# Patient Record
Sex: MALE | Race: WHITE | Employment: OTHER | URBAN - METROPOLITAN AREA
[De-identification: names, ages, dates, MRNs, and addresses within clinical notes are randomized per-mention and may not be internally consistent; named-entity substitution may affect disease eponyms.]

---

## 2020-01-24 ENCOUNTER — OFFICE VISIT (OUTPATIENT)
Dept: SURGERY | Age: 80
End: 2020-01-24

## 2020-01-24 VITALS
DIASTOLIC BLOOD PRESSURE: 87 MMHG | TEMPERATURE: 97.7 F | HEIGHT: 66 IN | OXYGEN SATURATION: 95 % | RESPIRATION RATE: 20 BRPM | SYSTOLIC BLOOD PRESSURE: 138 MMHG | WEIGHT: 183.4 LBS | HEART RATE: 76 BPM | BODY MASS INDEX: 29.47 KG/M2

## 2020-01-24 DIAGNOSIS — K40.91 RECURRENT RIGHT INGUINAL HERNIA: Primary | ICD-10-CM

## 2020-01-24 RX ORDER — HYDROCORTISONE 25 MG/G
CREAM TOPICAL AS NEEDED
COMMUNITY

## 2020-01-24 RX ORDER — MELOXICAM 15 MG/1
15 TABLET ORAL DAILY
COMMUNITY
End: 2022-01-04

## 2020-01-24 RX ORDER — PREDNISOLONE ACETATE 10 MG/ML
1 SUSPENSION/ DROPS OPHTHALMIC 4 TIMES DAILY
COMMUNITY
End: 2022-01-04

## 2020-01-24 NOTE — PROGRESS NOTES
To: Army Leonardo MD    From: Dora Farooq MD    Thank you for sending Amanda Molina to see us. Enjoyed meeting him and his wife. Encounter Date: 1/24/2020  History and Physical    Assessment:   Recurrent right inguinal hernia. Asymptomatic. Adipose only on US in the office today. Body mass index is 29.6 kg/m². Comorbid AVS, CAD, severe arthritis -- neck involvement. S/p open bilateral inguinal hernia repairs with mesh 2009. Fair functional status limited by arthritis, but does exercise. Daily baby aspirin. Plan: At this point I recommend watchful waiting. His hernia is asymptomatic and low risk for bowel strangulation. His comorbidities would put him at increased perioperative risk likely greater than the risks of incarceration or strangulation. We discussed the fact, that if the hernia becomes painful, ifhe begins to notice an enlarging bulge, or if it limits his exercise, we will need to reassess. Discussed possibility of incarceration, strangulation, increase in size of the hernia over time, and the risk of emergency surgery in the face of strangulation. Discussed techniques for reducing the hernia should it not reduce spontaneously. Knows to call immediately for incarceration. He has our card and was told to call anytime. HPI:   Marina Kwan is a [de-identified] y.o. male who is seen in consultation at the request of Army Leonardo MD for recurrent right inguinal hernia. It was found on routine physical.  Patient denies knowing it was there. Denies pain. Has not noticed a bulge. No n/v. No change in BM's. No urinary sxs. His activity is somewhat limited by his arthritis but he does regular exercise and is no limited by any groin discomfort.       Past Medical History:   Diagnosis Date    Arthritis     CAD (coronary artery disease)     Dementia ([facility] [address]     cognitive impairment    Gastrointestinal disorder     GERD    Hypertension     Psychiatric disorder     mild cognitive impairment     Past Surgical History:   Procedure Laterality Date    HX HERNIA REPAIR      HX ORTHOPAEDIC      right knee surgery    HX ORTHOPAEDIC      right hip replacement    HX ORTHOPAEDIC      carpal tunel repair      History reviewed. No pertinent family history. Social History     Tobacco Use    Smoking status: Former Smoker     Last attempt to quit: 1984     Years since quittin.7    Smokeless tobacco: Never Used   Substance Use Topics    Alcohol use: Yes     Alcohol/week: 4.0 standard drinks     Types: 2 Glasses of wine, 2 Cans of beer per week      Current Outpatient Medications   Medication Sig    meloxicam (MOBIC) 15 mg tablet Take 15 mg by mouth daily.  hydrocortisone (HYTONE) 2.5 % topical cream Apply  to affected area two (2) times a day. use thin layer    prednisoLONE acetate (PRED FORTE) 1 % ophthalmic suspension Administer 1 Drop to both eyes four (4) times daily.  peg 400-propylene glycol (SYSTANE, PROPYLENE GLYCOL,) 0.4-0.3 % drop as needed.  calcium carbonate (TUMS PO) Take  by mouth daily.  memantine (NAMENDA) 10 mg tablet Take 28 mg by mouth daily.  rivastigmine (EXELON) 9.5 mg/24 hr patch 1 Patch by TransDERmal route daily. Indications: 13.3 patch currently    lisinopril (PRINIVIL, ZESTRIL) 20 mg tablet Take 20 mg by mouth daily.  ezetimibe-simvastatin (VYTORIN 10/40) 10-40 mg per tablet Take 1 Tab by mouth daily. Indications: hypercholesterolemia, hyperlipidemia    buPROPion SR (WELLBUTRIN SR) 150 mg SR tablet Take 150 mg by mouth daily. Indications: major depressive disorder    raNITIdine (ZANTAC) 300 mg tablet Take 300 mg by mouth daily. Indications: gastroesophageal reflux disease    aspirin delayed-release 81 mg tablet Take  by mouth daily. Indications: myocardial infarction prevention, prevention of transient ischemic attack    tamsulosin (FLOMAX) 0.4 mg capsule Take 0.4 mg by mouth daily.  Indications: SYMPTOMATIC BENIGN PROSTATIC HYPERPLASIA    fish oil-dha-epa 1,200-144-216 mg cap Take 1,200 mg by mouth daily. Indications: hytperlipidemia    cholecalciferol (VITAMIN D3) 1,000 unit cap Take 1,000 Units by mouth daily. Indications: PREVENTION OF VITAMIN D DEFICIENCY    co-enzyme Q-10 (COQ-10) 100 mg capsule Take 100 mg by mouth daily. Indications: cardiac health    ascorbic acid, vitamin C, (VITAMIN C) 500 mg tablet Take 500 mg by mouth daily. Indications: VITAMIN C DEFICIENCY    b complex vitamins tablet Take 1 Tab by mouth daily. Indications: VITAMIN DEFICIENCY    melatonin tab tablet Take 5 mg by mouth nightly. Indications: sleep aid    acetaminophen (TYLENOL) 650 mg CR tablet Take 650 mg by mouth nightly. Indications: Pain     No current facility-administered medications for this visit. Allergies:  No Known Allergies     Review of Systems:  10 systems reviewed. See scanned sheet in \"Media\" section. See HPI for pertinent positives and negatives. Objective:     Visit Vitals  /87 (BP 1 Location: Left arm, BP Patient Position: Sitting)   Pulse 76   Temp 97.7 °F (36.5 °C) (Oral)   Resp 20   Ht 5' 6\" (1.676 m)   Wt 83.2 kg (183 lb 6.4 oz)   SpO2 95%   BMI 29.60 kg/m²       Physical Exam:  General appearance  Alert, cooperative, no distress, appears stated age, slow gait. HEENT Anicteric   Neck Decreased ROM. Back   No CVA tenderness   Lungs   Clear to auscultation bilaterally   Heart  Regular rate and rhythm. Abdomen   Soft. Bowel sounds normal. RIH present with valsalva. NT. Spontaneously reduces after valsalva.      Extremities no cyanosis or edema   Pulses 2+ right radial   Skin Skin color, texture, turgor normal.   Lymph nodes Inguinal nodes normal.   Neurologic Without overt sensory or motor deficit     Signed By: Elizabeth Ray MD     January 24, 2020

## 2020-01-24 NOTE — PROGRESS NOTES
Chief Complaint   Patient presents with    Possible Hernia     Pt seen @ the request of Kristi Leal to evaluate Possible re-current inguinal hernia. 1. Have you been to the ER, urgent care clinic since your last visit? Hospitalized since your last visit? New consult    2. Have you seen or consulted any other health care providers outside of the [address] [place] [address] since your last visit? new consult  Include any pap smears or colon screening. Discussed advanced directive. Patient states that he does  have an advanced directive.

## 2020-01-24 NOTE — Clinical Note
1/24/20 Patient: Raine Harris YOB: 1940 Date of Visit: 1/24/2020 Kylah Adkins MD 
[address] [facility] [address] [id number] VIA Facsimile: [phone] Dear Kylah Adkins MD, Thank you for referring Mr. Odalys Brown to [address] for evaluation. My notes for this consultation are attached. If you have questions, please do not hesitate to call me. I look forward to following your patient along with you.  
 
 
Sincerely, 
 
Alex Hardin MD

## 2021-08-09 ENCOUNTER — TRANSCRIBE ORDER (OUTPATIENT)
Dept: REGISTRATION | Age: 81
End: 2021-08-09

## 2021-08-09 ENCOUNTER — HOSPITAL ENCOUNTER (OUTPATIENT)
Dept: GENERAL RADIOLOGY | Age: 81
Discharge: HOME | End: 2021-08-09
Payer: MEDICARE

## 2021-08-09 DIAGNOSIS — M25.562 LEFT KNEE PAIN: Primary | ICD-10-CM

## 2021-08-09 DIAGNOSIS — M25.562 LEFT KNEE PAIN: ICD-10-CM

## 2021-08-09 PROCEDURE — 73562 X-RAY EXAM OF KNEE 3: CPT

## 2021-12-30 ENCOUNTER — HOSPITAL ENCOUNTER (OUTPATIENT)
Dept: PREADMISSION TESTING | Age: 81
Discharge: HOME | End: 2021-12-30
Payer: MEDICARE

## 2021-12-30 PROCEDURE — U0005 INFEC AGEN DETEC AMPLI PROBE: HCPCS

## 2021-12-31 LAB
SARS-COV-2, XPLCVT: NOT DETECTED
SOURCE, COVRS: NORMAL

## 2022-01-04 RX ORDER — FINASTERIDE 5 MG/1
5 TABLET, FILM COATED ORAL DAILY
COMMUNITY

## 2022-01-04 RX ORDER — LANOLIN ALCOHOL/MO/W.PET/CERES
1000 CREAM (GRAM) TOPICAL DAILY
COMMUNITY

## 2022-01-05 ENCOUNTER — ANESTHESIA EVENT (OUTPATIENT)
Dept: ENDOSCOPY | Age: 82
End: 2022-01-05
Payer: MEDICARE

## 2022-01-05 ENCOUNTER — HOSPITAL ENCOUNTER (OUTPATIENT)
Age: 82
Setting detail: OUTPATIENT SURGERY
Discharge: HOME | End: 2022-01-05
Attending: INTERNAL MEDICINE | Admitting: INTERNAL MEDICINE
Payer: MEDICARE

## 2022-01-05 ENCOUNTER — ANESTHESIA (OUTPATIENT)
Dept: ENDOSCOPY | Age: 82
End: 2022-01-05
Payer: MEDICARE

## 2022-01-05 VITALS
SYSTOLIC BLOOD PRESSURE: 143 MMHG | OXYGEN SATURATION: 99 % | HEIGHT: 67 IN | BODY MASS INDEX: 27.47 KG/M2 | WEIGHT: 175 LBS | TEMPERATURE: 97.7 F | RESPIRATION RATE: 17 BRPM | DIASTOLIC BLOOD PRESSURE: 57 MMHG | HEART RATE: 77 BPM

## 2022-01-05 PROCEDURE — 76060000031 HC ANESTHESIA FIRST 0.5 HR: Performed by: INTERNAL MEDICINE

## 2022-01-05 PROCEDURE — 77030013992 HC SNR POLYP ENDOSC BSC -B: Performed by: INTERNAL MEDICINE

## 2022-01-05 PROCEDURE — 74011000250 HC RX REV CODE- 250: Performed by: NURSE ANESTHETIST, CERTIFIED REGISTERED

## 2022-01-05 PROCEDURE — 88305 TISSUE EXAM BY PATHOLOGIST: CPT

## 2022-01-05 PROCEDURE — 76040000019: Performed by: INTERNAL MEDICINE

## 2022-01-05 PROCEDURE — 74011250636 HC RX REV CODE- 250/636: Performed by: NURSE ANESTHETIST, CERTIFIED REGISTERED

## 2022-01-05 PROCEDURE — 2709999900 HC NON-CHARGEABLE SUPPLY: Performed by: INTERNAL MEDICINE

## 2022-01-05 PROCEDURE — 74011250636 HC RX REV CODE- 250/636: Performed by: INTERNAL MEDICINE

## 2022-01-05 RX ORDER — FLUMAZENIL 0.1 MG/ML
0.2 INJECTION INTRAVENOUS
Status: DISCONTINUED | OUTPATIENT
Start: 2022-01-05 | End: 2022-01-05 | Stop reason: HOSPADM

## 2022-01-05 RX ORDER — SODIUM CHLORIDE 0.9 % (FLUSH) 0.9 %
5-40 SYRINGE (ML) INJECTION AS NEEDED
Status: DISCONTINUED | OUTPATIENT
Start: 2022-01-05 | End: 2022-01-05 | Stop reason: HOSPADM

## 2022-01-05 RX ORDER — GLYCOPYRROLATE 0.2 MG/ML
INJECTION INTRAMUSCULAR; INTRAVENOUS AS NEEDED
Status: DISCONTINUED | OUTPATIENT
Start: 2022-01-05 | End: 2022-01-05 | Stop reason: HOSPADM

## 2022-01-05 RX ORDER — PROPOFOL 10 MG/ML
INJECTION, EMULSION INTRAVENOUS AS NEEDED
Status: DISCONTINUED | OUTPATIENT
Start: 2022-01-05 | End: 2022-01-05 | Stop reason: HOSPADM

## 2022-01-05 RX ORDER — NALOXONE HYDROCHLORIDE 0.4 MG/ML
0.4 INJECTION, SOLUTION INTRAMUSCULAR; INTRAVENOUS; SUBCUTANEOUS
Status: DISCONTINUED | OUTPATIENT
Start: 2022-01-05 | End: 2022-01-05 | Stop reason: HOSPADM

## 2022-01-05 RX ORDER — EPINEPHRINE 0.1 MG/ML
1 INJECTION INTRACARDIAC; INTRAVENOUS
Status: DISCONTINUED | OUTPATIENT
Start: 2022-01-05 | End: 2022-01-05 | Stop reason: HOSPADM

## 2022-01-05 RX ORDER — DEXTROMETHORPHAN/PSEUDOEPHED 2.5-7.5/.8
1.2 DROPS ORAL
Status: DISCONTINUED | OUTPATIENT
Start: 2022-01-05 | End: 2022-01-05 | Stop reason: HOSPADM

## 2022-01-05 RX ORDER — ATROPINE SULFATE 0.1 MG/ML
0.5 INJECTION INTRAVENOUS
Status: DISCONTINUED | OUTPATIENT
Start: 2022-01-05 | End: 2022-01-05 | Stop reason: HOSPADM

## 2022-01-05 RX ORDER — ONDANSETRON 2 MG/ML
INJECTION INTRAMUSCULAR; INTRAVENOUS AS NEEDED
Status: DISCONTINUED | OUTPATIENT
Start: 2022-01-05 | End: 2022-01-05 | Stop reason: HOSPADM

## 2022-01-05 RX ORDER — LIDOCAINE HYDROCHLORIDE 20 MG/ML
INJECTION, SOLUTION EPIDURAL; INFILTRATION; INTRACAUDAL; PERINEURAL AS NEEDED
Status: DISCONTINUED | OUTPATIENT
Start: 2022-01-05 | End: 2022-01-05 | Stop reason: HOSPADM

## 2022-01-05 RX ORDER — SODIUM CHLORIDE 9 MG/ML
100 INJECTION, SOLUTION INTRAVENOUS CONTINUOUS
Status: DISCONTINUED | OUTPATIENT
Start: 2022-01-05 | End: 2022-01-05 | Stop reason: HOSPADM

## 2022-01-05 RX ORDER — SODIUM CHLORIDE 0.9 % (FLUSH) 0.9 %
5-40 SYRINGE (ML) INJECTION EVERY 8 HOURS
Status: DISCONTINUED | OUTPATIENT
Start: 2022-01-05 | End: 2022-01-05 | Stop reason: HOSPADM

## 2022-01-05 RX ADMIN — PROPOFOL 20 MG: 10 INJECTION, EMULSION INTRAVENOUS at 11:23

## 2022-01-05 RX ADMIN — PROPOFOL 100 MG: 10 INJECTION, EMULSION INTRAVENOUS at 11:18

## 2022-01-05 RX ADMIN — GLYCOPYRROLATE 0.2 MG: 0.2 INJECTION INTRAMUSCULAR; INTRAVENOUS at 11:25

## 2022-01-05 RX ADMIN — PROPOFOL 100 MG: 10 INJECTION, EMULSION INTRAVENOUS at 11:13

## 2022-01-05 RX ADMIN — GLYCOPYRROLATE 0.2 MG: 0.2 INJECTION INTRAMUSCULAR; INTRAVENOUS at 11:21

## 2022-01-05 RX ADMIN — SODIUM CHLORIDE 100 MCG: 900 INJECTION, SOLUTION INTRAVENOUS at 11:26

## 2022-01-05 RX ADMIN — SODIUM CHLORIDE 100 ML/HR: 0.9 INJECTION, SOLUTION INTRAVENOUS at 11:05

## 2022-01-05 RX ADMIN — LIDOCAINE HYDROCHLORIDE 40 MG: 20 INJECTION, SOLUTION EPIDURAL; INFILTRATION; INTRACAUDAL; PERINEURAL at 11:10

## 2022-01-05 RX ADMIN — ONDANSETRON HYDROCHLORIDE 4 MG: 2 INJECTION, SOLUTION INTRAMUSCULAR; INTRAVENOUS at 11:14

## 2022-01-05 NOTE — DISCHARGE INSTRUCTIONS
Lisandra Benson MD  [facility], [address] [facility]  [place], [address] [facility]  [phone]  [email or web address]. [email or web address]. [facility]  [id number]  1940    COLON DISCHARGE INSTRUCTIONS  Discomfort:  Redness at IV site- apply warm compress to area; if redness or soreness persist- contact your physician  There may be a slight amount of blood passed from the rectum  Gaseous discomfort- walking, belching will help relieve any discomfort  You may not operate a vehicle for 12 hours  You may not engage in an occupation involving machinery or appliances for rest of today  You may not drink alcoholic beverages for at least 12 hours  Avoid making any critical decisions for at least 24 hour  DIET:   High fiber diet. - however -  remember your colon is empty and a heavy meal will produce gas. Avoid these foods:  vegetables, fried / greasy foods, carbonated drinks for today      ACTIVITY:  You may resume your normal daily activities it is recommended that you spend the remainder of the day resting -  avoid any strenuous activity. CALL M.D. ANY SIGN OF:   Increasing pain, nausea, vomiting  Abdominal distension (swelling)  New increased bleeding (oral or rectal)  Fever (chills)  Pain in chest area  Bloody discharge from nose or mouth  Shortness of breath     COLONOSCOPY FINDINGS:  Your colonoscopy showed: one small polyp which we removed. Also have some internal hemorrhoids and diverticulosis. Follow-up Instructions:   Call Dr. Lisandra Benson if any questions or problems. Telephone # [phone]  Dr. Ld Styles office will notify you of the biopsy results within 7 to 10 days. Should have a repeat colonoscopy in 5 years.

## 2022-01-05 NOTE — ROUTINE PROCESS
Jacque Dears  1940  [id number]    Situation:  Verbal report received from: Erika  Procedure: Procedure(s):  COLONOSCOPY  ENDOSCOPIC POLYPECTOMY    Background:    Preoperative diagnosis: Encounter for screening colonoscopy [Z12.11]  Postoperative diagnosis: Diverticulosis, polyp, hemorrhoids    :  Dr. Fredy Schafer  Assistant(s): Endoscopy Technician-1: Jace Abreu  Endoscopy RN-1: Brookie Oppenheim, RN    Specimens:   ID Type Source Tests Collected by Time Destination   1 : Cecum polyp Preservative Cecum  Maryjane Wall MD 1/5/2022 1121 Pathology     H. Pylori  no    Assessment:  Intra-procedure medications     Anesthesia gave intra-procedure sedation and medications, see anesthesia flow sheet yes    Intravenous fluids: NS@ KVO     Vital signs stable yes    Abdominal assessment: round and soft yes    Recommendation:

## 2022-01-05 NOTE — H&P
Isreal Warren MD  [facility], [address]  [phone]  [email or web address]    Gastroenterology Outpatient History and Physical    Patient: Reza Collins    Physician: Vivian Soto MD    Vital Signs: Blood pressure (!) 169/70, pulse 70, temperature 98 °F (36.7 °C), resp. rate 22, height 5' 7\" (1.702 m), weight 79.4 kg (175 lb), SpO2 99 %. Allergies: No Known Allergies    Chief Complaint: Hx of polyps    History of Present Illness: Personal history of colonic polyps. Last colonoscopy was about 5 years ago at [facility] and showed polyp. Currently has no GI symptoms. No FH of colon cancer or polyps. History:  Past Medical History:   Diagnosis Date    Arthritis     CAD (coronary artery disease)     Chronic pain     generalized pain    Dementia ([address]     cognitive impairment    Gastrointestinal disorder     GERD    Hypertension     Psychiatric disorder     mild cognitive impairment    Sleep apnea     c-pap      Past Surgical History:   Procedure Laterality Date    HX CATARACT REMOVAL Bilateral     HX HERNIA REPAIR Bilateral     HX ORTHOPAEDIC Right     quad repair    HX ORTHOPAEDIC      right hip replacement    HX ORTHOPAEDIC      carpal tunel repair    HX ROTATOR CUFF REPAIR Right       Social History     Socioeconomic History    Marital status:    Tobacco Use    Smoking status: Former Smoker     Quit date: 1984     Years since quittin.7    Smokeless tobacco: Never Used   Substance and Sexual Activity    Alcohol use: Yes     Alcohol/week: 4.0 standard drinks     Types: 2 Glasses of wine, 2 Cans of beer per week    Drug use: No    History reviewed. No pertinent family history. There is no problem list on file for this patient. Medications:   Prior to Admission medications    Medication Sig Start Date End Date Taking?  Authorizing Provider   finasteride (PROSCAR) 5 mg tablet Take 5 mg by mouth daily. Yes Provider, Historical   cyanocobalamin (Vitamin B-12) 1,000 mcg tablet Take 1,000 mcg by mouth daily. Yes Provider, Historical   hydrocortisone (HYTONE) 2.5 % topical cream Apply  to affected area as needed. use thin layer    Yes Provider, Historical   rivastigmine (EXELON) 9.5 mg/24 hr patch 1 Patch by TransDERmal route daily. Indications: 13.3 patch currently   Yes Other, MD [place]   lisinopril (PRINIVIL, ZESTRIL) 20 mg tablet Take 20 mg by mouth daily. Yes Other, MD [place]   ezetimibe-simvastatin (VYTORIN 10/40) 10-40 mg per tablet Take 1 Tab by mouth daily. Indications: hypercholesterolemia, hyperlipidemia   Yes Other, MD [place]   buPROPion SR (WELLBUTRIN SR) 150 mg SR tablet Take 150 mg by mouth daily. Indications: major depressive disorder   Yes Other, MD [place]   raNITIdine (ZANTAC) 300 mg tablet Take 300 mg by mouth daily. Indications: gastroesophageal reflux disease   Yes Other, MD [place]   aspirin delayed-release 81 mg tablet Take  by mouth daily. Indications: myocardial infarction prevention, prevention of transient ischemic attack   Yes Other, MD [place]   tamsulosin (FLOMAX) 0.4 mg capsule Take 0.4 mg by mouth daily. Indications: SYMPTOMATIC BENIGN PROSTATIC HYPERPLASIA   Yes Other, MD [place]   fish oil-dha-epa 1,200-144-216 mg cap Take 1,200 mg by mouth daily. Indications: hytperlipidemia   Yes Other, MD [place]   cholecalciferol (VITAMIN D3) 1,000 unit cap Take 1,000 Units by mouth daily. Indications: PREVENTION OF VITAMIN D DEFICIENCY   Yes Other, MD [place]   co-enzyme Q-10 (COQ-10) 100 mg capsule Take 100 mg by mouth daily. Indications: cardiac health   Yes Other, MD [place]   b complex vitamins tablet Take 1 Tab by mouth daily. Indications: VITAMIN DEFICIENCY   Yes Other, MD [place]   peg 400-propylene glycol (SYSTANE, PROPYLENE GLYCOL,) 0.4-0.3 % drop as needed. Provider, Historical   calcium carbonate (TUMS PO) Take  by mouth as needed.     Provider, Historical   acetaminophen (TYLENOL) 650 mg CR tablet Take 650 mg by mouth as needed.  Indications: pain    Other, Phys, MD       Physical Exam:     General: well developed, well nourished   HEENT: unremarkable   Heart: regular rhythm no mumur    Lungs: clear   Abdominal:  benign   Neurological: unremarkable   Extremities: no edema     Findings/Diagnosis: Personal history of colonic polyps    Plan of Care/Planned Procedure: Colonoscopy with monitored anesthesia care sedation    Signed:  Era Severance., MD 1/5/2022

## 2022-01-05 NOTE — ANESTHESIA POSTPROCEDURE EVALUATION
Procedure(s):  COLONOSCOPY  ENDOSCOPIC POLYPECTOMY. total IV anesthesia    Anesthesia Post Evaluation        Patient location during evaluation: PACU  Note status: Adequate. Level of consciousness: responsive to verbal stimuli and sleepy but conscious  Pain management: satisfactory to patient  Airway patency: patent  Anesthetic complications: no  Cardiovascular status: acceptable  Respiratory status: acceptable  Hydration status: acceptable  Comments: +Post-Anesthesia Evaluation and Assessment    Patient: Mansoor Foster MRN: [id number]  SSN: [id number]   YOB: 1940  Age: 79 y.o. Sex: male      Cardiovascular Function/Vital Signs    BP (!) 111/57   Pulse 79   Temp 36.5 °C (97.7 °F)   Resp 14   Ht 5' 7\" (1.702 m)   Wt 79.4 kg (175 lb)   SpO2 99%   BMI 27.41 kg/m²     Patient is status post Procedure(s):  COLONOSCOPY  ENDOSCOPIC POLYPECTOMY. Nausea/Vomiting: Controlled. Postoperative hydration reviewed and adequate. Pain:  Pain Scale 1: Numeric (0 - 10) (01/05/22 1141)  Pain Intensity 1: 0 (01/05/22 1141)   Managed. Neurological Status: At baseline. Mental Status and Level of Consciousness: Arousable. Pulmonary Status:   O2 Device: None (Room air) (01/05/22 1141)   Adequate oxygenation and airway patent. Complications related to anesthesia: None    Post-anesthesia assessment completed. No concerns. Signed By: Hima Schuster MD    1/5/2022  Post anesthesia nausea and vomiting:  controlled      INITIAL Post-op Vital signs:   Vitals Value Taken Time   /71 01/05/22 1148   Temp 36.5 °C (97.7 °F) 01/05/22 1139   Pulse 78 01/05/22 1148   Resp 19 01/05/22 1148   SpO2 96 % 01/05/22 1148   Vitals shown include unvalidated device data.

## 2022-01-05 NOTE — PROGRESS NOTES
Endoscope was pre-cleaned at the bedside immediately following procedure by [facility] ET. For medications administered by anesthesia, see anesthesia chart.

## 2022-01-05 NOTE — ANESTHESIA PREPROCEDURE EVALUATION
Relevant Problems   No relevant active problems       Anesthetic History   No history of anesthetic complications            Review of Systems / Medical History  Patient summary reviewed, nursing notes reviewed and pertinent labs reviewed    Pulmonary        Sleep apnea: CPAP  Smoker      Comments: Former Smoker   Neuro/Psych         Psychiatric history    Comments: mild cognitive impairment Cardiovascular    Hypertension          CAD    Exercise tolerance: >4 METS  Comments: EKG 5/1/17: Sinus rhythm with marked sinus arrhythmia with occasional premature   ventricular complexes   Otherwise normal ECG    GI/Hepatic/Renal     GERD           Endo/Other        Arthritis     Other Findings   Comments: DJD  Chronic pain  Hearing loss          Physical Exam    Airway  Mallampati: II    Neck ROM: normal range of motion   Mouth opening: Normal     Cardiovascular  Regular rate and rhythm,  S1 and S2 normal,  no murmur, click, rub, or gallop             Dental    Dentition: Caps/crowns     Pulmonary  Breath sounds clear to auscultation               Abdominal  GI exam deferred       Other Findings            Anesthetic Plan    ASA: 3  Anesthesia type: total IV anesthesia          Induction: Intravenous  Anesthetic plan and risks discussed with: Patient

## 2022-01-05 NOTE — PROCEDURES
[facility] Matamor[facility]                  Colonoscopy Operative Report    1/5/2022      Bradley Factor  [id number]  1940    Procedure Type:   Colonoscopy --screening     Indications:    Personal history of colon polyps (screening only)     Pre-operative Diagnosis: see indication above    Post-operative Diagnosis:  See findings below    :  Everardo Reina MD    Referring Provider: Jennifer Haney MD      Sedation:  MAC anesthesia Propofol    Pre-Procedural Exam:      Airway: clear,  No airway problems anticipated  Heart: RRR, without gallops or rubs  Lungs: clear bilaterally without wheezes, crackles, or rhonchi  Abdomen: soft, nontender, nondistended, bowel sounds present  Mental Status: awake, alert and oriented to person, place and time     Procedure Details:  After informed consent was obtained with all risks and benefits of procedure explained and preoperative exam completed, the patient was taken to the endoscopy suite and placed in the left lateral decubitus position. Upon sequential sedation as per above, a digital rectal exam was performed . The Olympus videocolonoscope  was inserted in the rectum and carefully advanced to the cecum, which was identified by the ileocecal valve and appendiceal orifice. The cecum was identified by the ileocecal valve and appendiceal orifice. The quality of preparation was good. The colonoscope was slowly withdrawn with careful evaluation between folds. Retroflexion in the rectum was completed demonstrating internal hemorrhoids. Findings:   Rectum: Grade 1 internal hemorrhoid(s); Sigmoid:     - Diverticulosis  Descending Colon: Diverticulosis  Transverse Colon: normal  Ascending Colon: normal  Cecum: 6 mm polyp, cold snared  Terminal Ileum: not intubated      Specimen Removed:  cecal polyp    Complications: None. EBL:  None.     Impression:    diverticulosis,  Moderate in degree, involving the descending colon and the sigmoid  hemorrhoids internal, Moderate in size  small cecal polyp, removed    Recommendations: --Await pathology. , -Repeat colonoscopy in 5 years. High fiber diet. Resume normal medication(s). Discharge Disposition:  Home in the company of a  when able to ambulate. Pradeep Turner MD    1/5/2022     JUAN CARLOS Leger MD  [facility], [address] [facility] [address]  [phone]  [email or web address]

## 2022-07-07 ENCOUNTER — TRANSCRIBE ORDER (OUTPATIENT)
Dept: SCHEDULING | Age: 82
End: 2022-07-07

## 2022-07-07 DIAGNOSIS — M81.0 OSTEOPOROSIS: Primary | ICD-10-CM

## 2022-07-12 ENCOUNTER — TRANSCRIBE ORDER (OUTPATIENT)
Dept: REGISTRATION | Age: 82
End: 2022-07-12

## 2022-07-12 ENCOUNTER — HOSPITAL ENCOUNTER (OUTPATIENT)
Dept: GENERAL RADIOLOGY | Age: 82
Discharge: HOME | End: 2022-07-12
Payer: MEDICARE

## 2022-07-12 DIAGNOSIS — M54.50 LOW BACK PAIN: ICD-10-CM

## 2022-07-12 DIAGNOSIS — M54.50 LOW BACK PAIN: Primary | ICD-10-CM

## 2022-07-12 PROCEDURE — 72110 X-RAY EXAM L-2 SPINE 4/>VWS: CPT

## 2022-07-21 ENCOUNTER — TRANSCRIBE ORDER (OUTPATIENT)
Dept: SCHEDULING | Age: 82
End: 2022-07-21

## 2022-07-21 DIAGNOSIS — S32.010A WEDGE COMPRESSION FRACTURE OF FIRST LUMBAR VERTEBRA, INITIAL ENCOUNTER FOR CLOSED FRACTURE (HCC): Primary | ICD-10-CM

## 2022-07-25 ENCOUNTER — HOSPITAL ENCOUNTER (OUTPATIENT)
Dept: MRI IMAGING | Age: 82
Discharge: HOME | End: 2022-07-25
Attending: INTERNAL MEDICINE

## 2022-07-25 DIAGNOSIS — S32.010A WEDGE COMPRESSION FRACTURE OF FIRST LUMBAR VERTEBRA, INITIAL ENCOUNTER FOR CLOSED FRACTURE (HCC): ICD-10-CM

## 2022-07-29 ENCOUNTER — HOSPITAL ENCOUNTER (OUTPATIENT)
Dept: MRI IMAGING | Age: 82
Discharge: HOME | End: 2022-07-29
Attending: INTERNAL MEDICINE
Payer: MEDICARE

## 2022-07-29 PROCEDURE — 72148 MRI LUMBAR SPINE W/O DYE: CPT

## 2022-08-25 ENCOUNTER — HOSPITAL ENCOUNTER (OUTPATIENT)
Dept: GENERAL RADIOLOGY | Age: 82
Discharge: HOME | End: 2022-08-25
Attending: INTERNAL MEDICINE
Payer: MEDICARE

## 2022-08-25 DIAGNOSIS — M81.0 OSTEOPOROSIS: ICD-10-CM

## 2022-08-25 PROCEDURE — 77080 DXA BONE DENSITY AXIAL: CPT

## 2022-11-02 ENCOUNTER — HOSPITAL ENCOUNTER (EMERGENCY)
Age: 82
Discharge: HOME | End: 2022-11-02
Attending: EMERGENCY MEDICINE
Payer: MEDICARE

## 2022-11-02 ENCOUNTER — APPOINTMENT (OUTPATIENT)
Dept: CT IMAGING | Age: 82
End: 2022-11-02
Attending: EMERGENCY MEDICINE
Payer: MEDICARE

## 2022-11-02 VITALS
RESPIRATION RATE: 15 BRPM | TEMPERATURE: 98.7 F | SYSTOLIC BLOOD PRESSURE: 149 MMHG | DIASTOLIC BLOOD PRESSURE: 69 MMHG | OXYGEN SATURATION: 94 % | HEIGHT: 66 IN | HEART RATE: 89 BPM | BODY MASS INDEX: 28.12 KG/M2 | WEIGHT: 175 LBS

## 2022-11-02 DIAGNOSIS — R10.13 DYSPEPSIA: Primary | ICD-10-CM

## 2022-11-02 LAB
ALBUMIN SERPL-MCNC: 3.4 G/DL (ref 3.5–5)
ALBUMIN/GLOB SERPL: 1.1 {RATIO} (ref 1.1–2.2)
ALP SERPL-CCNC: 60 U/L (ref 45–117)
ALT SERPL-CCNC: 24 U/L (ref 12–78)
ANION GAP SERPL CALC-SCNC: 11 MMOL/L (ref 5–15)
AST SERPL-CCNC: 15 U/L (ref 15–37)
BASOPHILS # BLD: 0 K/UL (ref 0–0.1)
BASOPHILS NFR BLD: 0 % (ref 0–1)
BILIRUB SERPL-MCNC: 0.7 MG/DL (ref 0.2–1)
BUN SERPL-MCNC: 22 MG/DL (ref 6–20)
BUN/CREAT SERPL: 24 (ref 12–20)
CALCIUM SERPL-MCNC: 8.8 MG/DL (ref 8.5–10.1)
CHLORIDE SERPL-SCNC: 105 MMOL/L (ref 97–108)
CO2 SERPL-SCNC: 25 MMOL/L (ref 21–32)
CREAT SERPL-MCNC: 0.92 MG/DL (ref 0.7–1.3)
DIFFERENTIAL METHOD BLD: ABNORMAL
EOSINOPHIL # BLD: 0.1 K/UL (ref 0–0.4)
EOSINOPHIL NFR BLD: 1 % (ref 0–7)
ERYTHROCYTE [DISTWIDTH] IN BLOOD BY AUTOMATED COUNT: 12.2 % (ref 11.5–14.5)
FLUAV RNA SPEC QL NAA+PROBE: NOT DETECTED
FLUBV RNA SPEC QL NAA+PROBE: NOT DETECTED
GLOBULIN SER CALC-MCNC: 3.2 G/DL (ref 2–4)
GLUCOSE SERPL-MCNC: 167 MG/DL (ref 65–100)
HCT VFR BLD AUTO: 47.1 % (ref 36.6–50.3)
HGB BLD-MCNC: 16.5 G/DL (ref 12.1–17)
IMM GRANULOCYTES # BLD AUTO: 0 K/UL (ref 0–0.04)
IMM GRANULOCYTES NFR BLD AUTO: 0 % (ref 0–0.5)
INR PPP: 1 (ref 0.9–1.1)
LIPASE SERPL-CCNC: 87 U/L (ref 73–393)
LYMPHOCYTES # BLD: 0.4 K/UL (ref 0.8–3.5)
LYMPHOCYTES NFR BLD: 5 % (ref 12–49)
MAGNESIUM SERPL-MCNC: 1.7 MG/DL (ref 1.6–2.4)
MCH RBC QN AUTO: 33.9 PG (ref 26–34)
MCHC RBC AUTO-ENTMCNC: 35 G/DL (ref 30–36.5)
MCV RBC AUTO: 96.7 FL (ref 80–99)
MONOCYTES # BLD: 0.4 K/UL (ref 0–1)
MONOCYTES NFR BLD: 6 % (ref 5–13)
NEUTS SEG # BLD: 6.3 K/UL (ref 1.8–8)
NEUTS SEG NFR BLD: 88 % (ref 32–75)
NRBC # BLD: 0 K/UL (ref 0–0.01)
NRBC BLD-RTO: 0 PER 100 WBC
PLATELET # BLD AUTO: 173 K/UL (ref 150–400)
PLATELET COMMENTS,PCOM: ABNORMAL
PMV BLD AUTO: 9 FL (ref 8.9–12.9)
POTASSIUM SERPL-SCNC: 3.4 MMOL/L (ref 3.5–5.1)
PROT SERPL-MCNC: 6.6 G/DL (ref 6.4–8.2)
PROTHROMBIN TIME: 10.4 SEC (ref 9–11.1)
RBC # BLD AUTO: 4.87 M/UL (ref 4.1–5.7)
RBC MORPH BLD: ABNORMAL
SARS-COV-2, COV2: NOT DETECTED
SODIUM SERPL-SCNC: 141 MMOL/L (ref 136–145)
TROPONIN-HIGH SENSITIVITY: 14 NG/L (ref 0–76)
WBC # BLD AUTO: 7.2 K/UL (ref 4.1–11.1)

## 2022-11-02 PROCEDURE — 74011000250 HC RX REV CODE- 250: Performed by: EMERGENCY MEDICINE

## 2022-11-02 PROCEDURE — 36415 COLL VENOUS BLD VENIPUNCTURE: CPT

## 2022-11-02 PROCEDURE — 85025 COMPLETE CBC W/AUTO DIFF WBC: CPT

## 2022-11-02 PROCEDURE — 85610 PROTHROMBIN TIME: CPT

## 2022-11-02 PROCEDURE — 87636 SARSCOV2 & INF A&B AMP PRB: CPT

## 2022-11-02 PROCEDURE — 93005 ELECTROCARDIOGRAM TRACING: CPT

## 2022-11-02 PROCEDURE — 74177 CT ABD & PELVIS W/CONTRAST: CPT

## 2022-11-02 PROCEDURE — 74011250636 HC RX REV CODE- 250/636: Performed by: EMERGENCY MEDICINE

## 2022-11-02 PROCEDURE — 96361 HYDRATE IV INFUSION ADD-ON: CPT

## 2022-11-02 PROCEDURE — 80053 COMPREHEN METABOLIC PANEL: CPT

## 2022-11-02 PROCEDURE — 99285 EMERGENCY DEPT VISIT HI MDM: CPT

## 2022-11-02 PROCEDURE — 84484 ASSAY OF TROPONIN QUANT: CPT

## 2022-11-02 PROCEDURE — 96374 THER/PROPH/DIAG INJ IV PUSH: CPT

## 2022-11-02 PROCEDURE — 83735 ASSAY OF MAGNESIUM: CPT

## 2022-11-02 PROCEDURE — 83690 ASSAY OF LIPASE: CPT

## 2022-11-02 PROCEDURE — 74011000636 HC RX REV CODE- 636: Performed by: EMERGENCY MEDICINE

## 2022-11-02 PROCEDURE — 74011250637 HC RX REV CODE- 250/637: Performed by: EMERGENCY MEDICINE

## 2022-11-02 RX ORDER — ONDANSETRON 2 MG/ML
8 INJECTION INTRAMUSCULAR; INTRAVENOUS ONCE
Status: COMPLETED | OUTPATIENT
Start: 2022-11-02 | End: 2022-11-02

## 2022-11-02 RX ORDER — FAMOTIDINE 20 MG/1
20 TABLET, FILM COATED ORAL 2 TIMES DAILY
Qty: 20 TABLET | Refills: 0 | Status: SHIPPED | OUTPATIENT
Start: 2022-11-02 | End: 2022-11-12

## 2022-11-02 RX ORDER — SODIUM CHLORIDE 0.9 % (FLUSH) 0.9 %
5-10 SYRINGE (ML) INJECTION ONCE
Status: DISCONTINUED | OUTPATIENT
Start: 2022-11-02 | End: 2022-11-02 | Stop reason: HOSPADM

## 2022-11-02 RX ORDER — FAMOTIDINE 20 MG/1
20 TABLET, FILM COATED ORAL
Status: COMPLETED | OUTPATIENT
Start: 2022-11-02 | End: 2022-11-02

## 2022-11-02 RX ADMIN — ALUMINUM HYDROXIDE, MAGNESIUM HYDROXIDE, AND SIMETHICONE 40 ML: 200; 200; 20 SUSPENSION ORAL at 13:29

## 2022-11-02 RX ADMIN — IOPAMIDOL 100 ML: 612 INJECTION, SOLUTION INTRAVENOUS at 13:17

## 2022-11-02 RX ADMIN — FAMOTIDINE 20 MG: 20 TABLET, FILM COATED ORAL at 13:29

## 2022-11-02 RX ADMIN — SODIUM CHLORIDE 1000 ML: 9 INJECTION, SOLUTION INTRAVENOUS at 11:55

## 2022-11-02 RX ADMIN — ONDANSETRON 8 MG: 2 INJECTION INTRAMUSCULAR; INTRAVENOUS at 11:54

## 2022-11-02 NOTE — ED PROVIDER NOTES
[facility]          Date: 11/2/2022  Patient Name: Germania Whaley    History of Presenting Illness     Chief Complaint   Patient presents with    Vomiting     Heart burn starting around 0300 followed by n/v/d starting just PTA. History Provided By: Patient    HPI: Germania Whaley is a 79 y.o. male, pmhx hypertension, CAD, dementia, who presents ambulatory to the ED c/o AP    States he awoke around 3 AM not feeling well with some epigastric burning and some gastric reflux. Has not tried to go back to bed but symptoms did not really resolved. He had a couple episodes of diarrhea since that time to called his primary care doctor this morning and recommended he come to the emergency room. When he was about to leave the house in route to the emergency room he felt extremely nauseous and started with an episode of vomiting. He has not had any further vomiting but still feels nauseous and feels confused. Patient denies any coughing, shortness of breath, fevers, chills as well as any back pain and leg pain. PCP: Pearley Boxer, MD    Allergies: nkda    There are no other complaints, changes, or physical findings at this time. Current Outpatient Medications   Medication Sig Dispense Refill    famotidine (Pepcid) 20 mg tablet Take 1 Tablet by mouth two (2) times a day for 10 days. 20 Tablet 0    finasteride (PROSCAR) 5 mg tablet Take 5 mg by mouth daily. cyanocobalamin (Vitamin B-12) 1,000 mcg tablet Take 1,000 mcg by mouth daily. hydrocortisone (HYTONE) 2.5 % topical cream Apply  to affected area as needed. use thin layer       peg 400-propylene glycol (SYSTANE, PROPYLENE GLYCOL,) 0.4-0.3 % drop as needed. calcium carbonate (TUMS PO) Take  by mouth as needed. rivastigmine (EXELON) 9.5 mg/24 hr patch 1 Patch by TransDERmal route daily. Indications: 13.3 patch currently      lisinopril (PRINIVIL, ZESTRIL) 20 mg tablet Take 20 mg by mouth daily. ezetimibe-simvastatin (VYTORIN 10/40) 10-40 mg per tablet Take 1 Tab by mouth daily. Indications: hypercholesterolemia, hyperlipidemia      buPROPion SR (WELLBUTRIN SR) 150 mg SR tablet Take 150 mg by mouth daily. Indications: major depressive disorder      raNITIdine (ZANTAC) 300 mg tablet Take 300 mg by mouth daily. Indications: gastroesophageal reflux disease      aspirin delayed-release 81 mg tablet Take  by mouth daily. Indications: myocardial infarction prevention, prevention of transient ischemic attack      tamsulosin (FLOMAX) 0.4 mg capsule Take 0.4 mg by mouth daily. Indications: SYMPTOMATIC BENIGN PROSTATIC HYPERPLASIA      fish oil-dha-epa 1,200-144-216 mg cap Take 1,200 mg by mouth daily. Indications: hytperlipidemia      cholecalciferol (VITAMIN D3) 1,000 unit cap Take 1,000 Units by mouth daily. Indications: PREVENTION OF VITAMIN D DEFICIENCY      co-enzyme Q-10 (COQ-10) 100 mg capsule Take 100 mg by mouth daily. Indications: cardiac health      b complex vitamins tablet Take 1 Tab by mouth daily. Indications: VITAMIN DEFICIENCY      acetaminophen (TYLENOL) 650 mg CR tablet Take 650 mg by mouth as needed.  Indications: pain         Past History     Past Medical History:  Past Medical History:   Diagnosis Date    Arthritis     CAD (coronary artery disease)     Chronic pain     generalized pain    Dementia (HCC)     cognitive impairment    Gastrointestinal disorder     GERD    Hypertension     Psychiatric disorder     mild cognitive impairment    Sleep apnea     c-pap       Past Surgical History:  Past Surgical History:   Procedure Laterality Date    COLONOSCOPY N/A 1/5/2022    COLONOSCOPY performed by Florencio Sanchez MD at [facility] ENDOSCOPY    COLONOSCOPY,PILI Rae  1/5/2022         COLORECTAL SCRN; HI RISK IND  1/5/2022         HX CATARACT REMOVAL Bilateral     HX HERNIA REPAIR Bilateral     HX ORTHOPAEDIC Right     quad repair    HX ORTHOPAEDIC      right hip replacement    HX ORTHOPAEDIC      carpal tunel repair    HX ROTATOR CUFF REPAIR Right        Family History:  No family history on file. Social History:  Social History     Tobacco Use    Smoking status: Former     Types: Cigarettes     Quit date: 1984     Years since quittin.5    Smokeless tobacco: Never   Substance Use Topics    Alcohol use: Yes     Alcohol/week: 4.0 standard drinks     Types: 2 Glasses of wine, 2 Cans of beer per week    Drug use: No       Allergies:  No Known Allergies      Review of Systems   Review of Systems   Constitutional:  Negative for activity change, appetite change, chills, fever and unexpected weight change. HENT:  Negative for congestion. Eyes:  Negative for pain and visual disturbance. Respiratory:  Negative for cough and shortness of breath. Cardiovascular:  Negative for chest pain. Gastrointestinal:  Positive for abdominal pain, diarrhea and nausea. Negative for vomiting. Genitourinary:  Negative for dysuria. Musculoskeletal:  Negative for back pain. Skin:  Negative for rash. Neurological:  Negative for headaches. Physical Exam   Physical Exam  Vitals and nursing note reviewed. Constitutional:       Appearance: He is well-developed. He is not diaphoretic. Comments: Elderly male appears pale, hypertensive and tachycardic in moderate acute distress   HENT:      Head: Normocephalic and atraumatic. Eyes:      General:         Right eye: No discharge. Left eye: No discharge. Conjunctiva/sclera: Conjunctivae normal.      Pupils: Pupils are equal, round, and reactive to light. Cardiovascular:      Rate and Rhythm: Tachycardia present. Rhythm irregular. Heart sounds: Normal heart sounds. No murmur heard. Pulmonary:      Effort: Pulmonary effort is normal. No respiratory distress. Breath sounds: Normal breath sounds. No stridor. No wheezing, rhonchi or rales. Abdominal:      General: Bowel sounds are normal. There is no distension. Tenderness: There is no abdominal tenderness. There is no guarding or rebound. Comments: Abdomen is protuberant but soft without focal site of tenderness. Musculoskeletal:         General: Normal range of motion. Cervical back: Normal range of motion and neck supple. Right lower leg: No edema. Left lower leg: No edema. Skin:     General: Skin is warm and dry. Findings: No rash. Neurological:      Mental Status: He is alert and oriented to person, place, and time. Cranial Nerves: No cranial nerve deficit. Motor: No abnormal muscle tone.      Diagnostic Study Results     Labs -     Recent Results (from the past 12 hour(s))   EKG, 12 LEAD, INITIAL    Collection Time: 11/02/22 11:37 AM   Result Value Ref Range    Ventricular Rate 101 BPM    Atrial Rate 101 BPM    P-R Interval 174 ms    QRS Duration 96 ms    Q-T Interval 348 ms    QTC Calculation (Bezet) 451 ms    Calculated P Axis 50 degrees    Calculated R Axis 114 degrees    Calculated T Axis 25 degrees    Diagnosis       Sinus tachycardia with premature atrial complexes  Right axis deviation  Incomplete right bundle branch block  Right ventricular hypertrophy with repolarization abnormality  Abnormal ECG  When compared with ECG of 01-MAY-2017 18:56,  premature ventricular complexes are no longer present  premature atrial complexes are now present  Incomplete right bundle branch block is now present     CBC WITH AUTOMATED DIFF    Collection Time: 11/02/22 11:39 AM   Result Value Ref Range    WBC 7.2 4.1 - 11.1 K/uL    RBC 4.87 4.10 - 5.70 M/uL    HGB 16.5 12.1 - 17.0 g/dL    HCT 47.1 36.6 - 50.3 %    MCV 96.7 80.0 - 99.0 FL    MCH 33.9 26.0 - 34.0 PG    MCHC 35.0 30.0 - 36.5 g/dL    RDW 12.2 11.5 - 14.5 %    PLATELET 082 043 - 197 K/uL    MPV 9.0 8.9 - 12.9 FL    NRBC 0.0 0.0  WBC    ABSOLUTE NRBC 0.00 0.00 - 0.01 K/uL    NEUTROPHILS 88 (H) 32 - 75 %    LYMPHOCYTES 5 (L) 12 - 49 %    MONOCYTES 6 5 - 13 %    EOSINOPHILS 1 0 - 7 %    BASOPHILS 0 0 - 1 %    IMMATURE GRANULOCYTES 0 0 - 0.5 %    ABS. NEUTROPHILS 6.3 1.8 - 8.0 K/UL    ABS. LYMPHOCYTES 0.4 (L) 0.8 - 3.5 K/UL    ABS. MONOCYTES 0.4 0.0 - 1.0 K/UL    ABS. EOSINOPHILS 0.1 0.0 - 0.4 K/UL    ABS. BASOPHILS 0.0 0.0 - 0.1 K/UL    ABS. IMM. GRANS. 0.0 0.00 - 0.04 K/UL    DF SMEAR SCANNED      PLATELET COMMENTS ADEQUATE PLATELETS      RBC COMMENTS NORMOCYTIC, NORMOCHROMIC     PROTHROMBIN TIME + INR    Collection Time: 11/02/22 11:39 AM   Result Value Ref Range    INR 1.0 0.9 - 1.1      Prothrombin time 10.4 9.0 - 74.6 sec   METABOLIC PANEL, COMPREHENSIVE    Collection Time: 11/02/22 11:39 AM   Result Value Ref Range    Sodium 141 136 - 145 mmol/L    Potassium 3.4 (L) 3.5 - 5.1 mmol/L    Chloride 105 97 - 108 mmol/L    CO2 25 21 - 32 mmol/L    Anion gap 11 5 - 15 mmol/L    Glucose 167 (H) 65 - 100 mg/dL    BUN 22 (H) 6 - 20 MG/DL    Creatinine 0.92 0.70 - 1.30 MG/DL    BUN/Creatinine ratio 24 (H) 12 - 20      eGFR >60 >60 ml/min/1.73m2    Calcium 8.8 8.5 - 10.1 MG/DL    Bilirubin, total 0.7 0.2 - 1.0 MG/DL    ALT (SGPT) 24 12 - 78 U/L    AST (SGOT) 15 15 - 37 U/L    Alk. phosphatase 60 45 - 117 U/L    Protein, total 6.6 6.4 - 8.2 g/dL    Albumin 3.4 (L) 3.5 - 5.0 g/dL    Globulin 3.2 2.0 - 4.0 g/dL    A-G Ratio 1.1 1.1 - 2.2     LIPASE    Collection Time: 11/02/22 11:39 AM   Result Value Ref Range    Lipase 87 73 - 393 U/L   MAGNESIUM    Collection Time: 11/02/22 11:39 AM   Result Value Ref Range    Magnesium 1.7 1.6 - 2.4 mg/dL   TROPONIN-HIGH SENSITIVITY    Collection Time: 11/02/22 11:39 AM   Result Value Ref Range    Troponin-High Sensitivity 14 0 - 76 ng/L   COVID-19 WITH INFLUENZA A/B    Collection Time: 11/02/22 11:55 AM   Result Value Ref Range    SARS-CoV-2 by PCR Not detected NOTD      Influenza A by PCR Not detected NOTD      Influenza B by PCR Not detected NOTD         Radiologic Studies -   CT ABD PELV W CONT   Final Result   1.  Right inguinal hernia containing fat and fluid at this time. 2. Cyst in the pancreatic body are suggested, 1.6 and 0.7 cm, of uncertain   etiology and significance, but possibly representing small IPMN's. Correlate   with any previous imaging and follow-up as appropriate. If there is no prior   imaging, follow-up abdominal CT in one year suggested. 3. Bilateral nonobstructing intrarenal calculi with a tiny calculus in the   urinary bladder lumen which may have been recently passed. 4. Diverticulosis without diverticulitis. 5. Other incidental findings. CT Results  (Last 48 hours)                 11/02/22 1319  CT ABD PELV W CONT Final result    Impression:  1. Right inguinal hernia containing fat and fluid at this time. 2. Cyst in the pancreatic body are suggested, 1.6 and 0.7 cm, of uncertain   etiology and significance, but possibly representing small IPMN's. Correlate   with any previous imaging and follow-up as appropriate. If there is no prior   imaging, follow-up abdominal CT in one year suggested. 3. Bilateral nonobstructing intrarenal calculi with a tiny calculus in the   urinary bladder lumen which may have been recently passed. 4. Diverticulosis without diverticulitis. 5. Other incidental findings. Narrative:  EXAM: CT ABD PELV W CONT       INDICATION: ap, vomiting and dirrhea       COMPARISON: None        CONTRAST: 100 mL of Isovue-370. ORAL CONTRAST: None           TECHNIQUE:    Following the uneventful intravenous administration of contrast, thin axial   images were obtained through the abdomen and pelvis. Coronal and sagittal   reconstructions were generated. CT dose reduction was achieved through use of a   standardized protocol tailored for this examination and automatic exposure   control for dose modulation. FINDINGS:    LOWER THORAX: No significant abnormality in the incidentally imaged lower chest.   Minimal pleural-parenchymal thickening in the lung bases. LIVER: No mass.    BILIARY TREE: Gallbladder is within normal limits. CBD is not dilated. SPLEEN: within normal limits. PANCREAS: A 1.6 cm hypodensity appears in the pancreatic body adjacent to a   nondilated main pancreatic duct. A smaller hypodensity in the adjacent   pancreatic body measures 0.7 cm. ADRENALS: Unremarkable. KIDNEYS: Nonobstructing bilateral intrarenal calculi. No hydroureteronephrosis. STOMACH: Unremarkable. SMALL BOWEL: No dilatation or wall thickening. COLON: No dilatation or wall thickening. Diverticula without associated acute   inflammatory change. APPENDIX: Normal   PERITONEUM: No ascites or pneumoperitoneum. RETROPERITONEUM: No lymphadenopathy or aortic aneurysm. REPRODUCTIVE ORGANS: Prostate is enlarged, although visualization is limited by   metallic artifact. URINARY BLADDER: Tiny 4 mm calcification in the right posterior bladder lumen. No bladder wall mass. Mild prominence of bladder wall thickness. However,   visualization is limited by metallic artifact. BONES: No destructive bone lesion. Right hip arthroplasty creating significant   artifact at the lower pelvic level. Age indeterminate L1 and L3 compression   deformities, mild. 1.0 cm anterolisthesis of L4-5 with degenerative disc   disease. ABDOMINAL WALL: Right inguinal hernia containing fat and fluid. There is no   bowel contained within the hernia at this time. ADDITIONAL COMMENTS: N/A                 CXR Results  (Last 48 hours)      None              Medical Decision Making   I am the first provider for this patient. I reviewed the vital signs, available nursing notes, past medical history, past surgical history, family history and social history. Vital Signs-Reviewed the patient's vital signs.   Patient Vitals for the past 12 hrs:   Temp Pulse Resp BP SpO2   11/02/22 1430 -- 89 15 -- 94 %   11/02/22 1415 -- 88 17 -- 94 %   11/02/22 1400 -- 83 14 -- 94 %   11/02/22 1345 -- 84 14 -- 98 %   11/02/22 1332 -- 85 12 -- 98 % 11/02/22 1330 -- 84 30 -- 98 %   11/02/22 1302 -- 84 18 (!) 149/69 96 %   11/02/22 1300 -- 80 19 -- 97 %   11/02/22 1247 -- 82 18 (!) 149/73 97 %   11/02/22 1245 -- 83 20 -- 98 %   11/02/22 1232 -- 83 18 (!) 152/65 96 %   11/02/22 1230 -- 86 17 -- 97 %   11/02/22 1217 -- 90 13 (!) 149/61 96 %   11/02/22 1215 -- 85 14 -- 96 %   11/02/22 1209 98.7 °F (37.1 °C) -- -- -- --   11/02/22 1202 -- 95 14 (!) 154/72 96 %   11/02/22 1200 -- 96 -- -- 97 %   11/02/22 1148 -- -- -- 139/77 --   11/02/22 1130 -- (!) 104 16 (!) 180/82 95 %       Pulse Oximetry Analysis - 95% on RA    Cardiac Monitor:   Rate: 96bpm  Rhythm: Normal Sinus Rhythm      Records Reviewed: Nursing Notes, Old Medical Records, Previous Radiology Studies, and Previous Laboratory Studies    Provider Notes (Medical Decision Making):   MDM: Elderly man presenting with abdominal pain, nausea vomiting and diarrhea. He attributes this to [address] [place] he ate last night but he and his wife ate all the same things except he also had a spring roll. Currently tachycardic with an irregular rhythm and patient denies history of arrhythmia in the past.  Symptomatic medications to be provided while obtaining EKG and labs for pancreatitis, biliary colic, ACS    ED Course:   Initial assessment performed. The patients presenting problems have been discussed, and they are in agreement with the care plan formulated and outlined with them. I have encouraged them to ask questions as they arise throughout their visit. EKG interpretation: (Preliminary)  Rhythm: Sinus tachycardia at a rate of 101 bpm with occasional PVCs; normal ME; normal QRS; normal QTC with right axis deviation.   Comparing to EKG dated May 2017, lead III aVF as well as V1 through V3 has inverted T waves which almost appear biphasic  This EKG was interpreted by ED Provider Sara Damon MD    PROGRESS NOTE:    ED Course as of 11/02/22 2033   Wed Nov 02, 2022   1303 Patient reevaluated and states his nausea is resolved but he still having some intermittent reflux and burning. Further medications to be provided. Discussed lab results. [JT]      ED Course User Index  [JT] Angelica Carroll MD        Discharge note:  Pt re-evaluated and noted to be feeling better, ready for discharge. Updated pt on all final lab findings. Will follow up as instructed. All questions have been answered, pt voiced understanding and agreement with plan. Specific return precautions provided as well as instructions to return to the ED should sx worsen at any time. Vital signs stable for discharge. Critical Care Time:   0      Diagnosis     Clinical Impression:   1. Dyspepsia        PLAN:  1. Discharge Medication List as of 11/2/2022  2:18 PM        START taking these medications    Details   famotidine (Pepcid) 20 mg tablet Take 1 Tablet by mouth two (2) times a day for 10 days. , Normal, Disp-20 Tablet, R-0           CONTINUE these medications which have NOT CHANGED    Details   finasteride (PROSCAR) 5 mg tablet Take 5 mg by mouth daily. , Historical Med      cyanocobalamin (Vitamin B-12) 1,000 mcg tablet Take 1,000 mcg by mouth daily. , Historical Med      hydrocortisone (HYTONE) 2.5 % topical cream Apply  to affected area as needed. use thin layer , Historical Med      peg 400-propylene glycol (SYSTANE, PROPYLENE GLYCOL,) 0.4-0.3 % drop as needed., Historical Med      calcium carbonate (TUMS PO) Take  by mouth as needed., Historical Med      rivastigmine (EXELON) 9.5 mg/24 hr patch 1 Patch by TransDERmal route daily. Indications: 13.3 patch currently, Historical Med      lisinopril (PRINIVIL, ZESTRIL) 20 mg tablet Take 20 mg by mouth daily. , Historical Med      ezetimibe-simvastatin (VYTORIN 10/40) 10-40 mg per tablet Take 1 Tab by mouth daily. Indications: hypercholesterolemia, hyperlipidemia, Historical Med      buPROPion SR (WELLBUTRIN SR) 150 mg SR tablet Take 150 mg by mouth daily.  Indications: major depressive disorder, Historical Med      raNITIdine (ZANTAC) 300 mg tablet Take 300 mg by mouth daily. Indications: gastroesophageal reflux disease, Historical Med      aspirin delayed-release 81 mg tablet Take  by mouth daily. Indications: myocardial infarction prevention, prevention of transient ischemic attack, Historical Med      tamsulosin (FLOMAX) 0.4 mg capsule Take 0.4 mg by mouth daily. Indications: SYMPTOMATIC BENIGN PROSTATIC HYPERPLASIA, Historical Med      fish oil-dha-epa 1,200-144-216 mg cap Take 1,200 mg by mouth daily. Indications: hytperlipidemia, Historical Med      cholecalciferol (VITAMIN D3) 1,000 unit cap Take 1,000 Units by mouth daily. Indications: PREVENTION OF VITAMIN D DEFICIENCY, Historical Med      co-enzyme Q-10 (COQ-10) 100 mg capsule Take 100 mg by mouth daily. Indications: cardiac health, Historical Med      b complex vitamins tablet Take 1 Tab by mouth daily. Indications: VITAMIN DEFICIENCY, Historical Med      acetaminophen (TYLENOL) 650 mg CR tablet Take 650 mg by mouth as needed. Indications: pain, Historical Med           2. Follow-up Information       Follow up With Specialties Details Why Contact Info    Quita Vee MD Internal Medicine Physician Schedule an appointment as soon as possible for a visit   [address]      [address] [facility] Emergency Medicine  If symptoms worsen [address] [phone]          Return to ED if worse     Disposition:  Home       Please note, this dictation was completed with [employer], the [employer] voice recognition software. Quite often unanticipated grammatical, syntax, homophones, and other interpretive errors are inadvertently transcribed by the computer software. Please disregard these errors. Please excuse any errors that have escaped final proof reading.

## 2022-11-02 NOTE — DISCHARGE INSTRUCTIONS
You were seen in the emergency room for abdominal pain with nausea, vomiting and diarrhea. Your lab results are normal and your CT does not show any evidence of infection. It does show cyst in your pancreas and the radiologist recommends a repeat image in 1 year to make sure these resolve. Follow-up with your primary care doctor for recheck of your symptoms. If your symptoms worsen, return to the emergency room.

## 2022-11-03 LAB
ATRIAL RATE: 101 BPM
CALCULATED P AXIS, ECG09: 50 DEGREES
CALCULATED R AXIS, ECG10: 114 DEGREES
CALCULATED T AXIS, ECG11: 25 DEGREES
DIAGNOSIS, 93000: NORMAL
P-R INTERVAL, ECG05: 174 MS
Q-T INTERVAL, ECG07: 348 MS
QRS DURATION, ECG06: 96 MS
QTC CALCULATION (BEZET), ECG08: 451 MS
VENTRICULAR RATE, ECG03: 101 BPM

## 2023-05-04 ENCOUNTER — HOSPITAL ENCOUNTER (OUTPATIENT)
Dept: GENERAL RADIOLOGY | Age: 83
Discharge: HOME | End: 2023-05-04
Payer: MEDICARE

## 2023-05-04 ENCOUNTER — TRANSCRIBE ORDER (OUTPATIENT)
Dept: REGISTRATION | Age: 83
End: 2023-05-04

## 2023-05-04 DIAGNOSIS — M25.552 LEFT HIP PAIN: ICD-10-CM

## 2023-05-04 DIAGNOSIS — M25.552 LEFT HIP PAIN: Primary | ICD-10-CM

## 2023-05-04 PROCEDURE — 73502 X-RAY EXAM HIP UNI 2-3 VIEWS: CPT

## 2023-05-10 ENCOUNTER — HOSPITAL ENCOUNTER (OUTPATIENT)
Facility: HOSPITAL | Age: 83
Discharge: HOME | End: 2023-05-13
Payer: MEDICARE

## 2023-05-10 DIAGNOSIS — D49.0 NEOPLASM OF UNSPECIFIED BEHAVIOR OF DIGESTIVE SYSTEM: ICD-10-CM

## 2023-05-10 PROCEDURE — 74176 CT ABD & PELVIS W/O CONTRAST: CPT

## 2023-06-20 ENCOUNTER — TRANSCRIBE ORDERS (OUTPATIENT)
Facility: HOSPITAL | Age: 83
End: 2023-06-20

## 2023-06-20 DIAGNOSIS — N62 HYPERTROPHY OF BREAST: Primary | ICD-10-CM

## 2023-06-26 ENCOUNTER — HOSPITAL ENCOUNTER (OUTPATIENT)
Facility: HOSPITAL | Age: 83
Discharge: HOME | End: 2023-06-29
Payer: MEDICARE

## 2023-06-26 VITALS — BODY MASS INDEX: 28.25 KG/M2 | WEIGHT: 175 LBS

## 2023-06-26 DIAGNOSIS — N62 HYPERTROPHY OF BREAST: ICD-10-CM

## 2023-06-26 PROCEDURE — 77066 DX MAMMO INCL CAD BI: CPT

## 2023-06-26 PROCEDURE — 76642 ULTRASOUND BREAST LIMITED: CPT

## 2024-11-07 ENCOUNTER — HOSPITAL ENCOUNTER (EMERGENCY)
Facility: HOSPITAL | Age: 84
Discharge: HOME | End: 2024-11-07
Attending: EMERGENCY MEDICINE
Payer: MEDICARE

## 2024-11-07 VITALS
WEIGHT: 183 LBS | DIASTOLIC BLOOD PRESSURE: 65 MMHG | HEART RATE: 91 BPM | RESPIRATION RATE: 20 BRPM | BODY MASS INDEX: 29.41 KG/M2 | HEIGHT: 66 IN | OXYGEN SATURATION: 94 % | TEMPERATURE: 98.5 F | SYSTOLIC BLOOD PRESSURE: 140 MMHG

## 2024-11-07 DIAGNOSIS — R33.8 ACUTE URINARY RETENTION: Primary | ICD-10-CM

## 2024-11-07 DIAGNOSIS — N30.00 ACUTE CYSTITIS WITHOUT HEMATURIA: ICD-10-CM

## 2024-11-07 LAB
APPEARANCE UR: CLEAR
BACTERIA URNS QL MICRO: ABNORMAL /HPF
BILIRUB UR QL: NEGATIVE
COLOR UR: ABNORMAL
EPITH CASTS URNS QL MICRO: ABNORMAL /LPF
GLUCOSE UR STRIP.AUTO-MCNC: NEGATIVE MG/DL
HGB UR QL STRIP: ABNORMAL
KETONES UR QL STRIP.AUTO: 15 MG/DL
LEUKOCYTE ESTERASE UR QL STRIP.AUTO: ABNORMAL
NITRITE UR QL STRIP.AUTO: NEGATIVE
PH UR STRIP: 6 (ref 5–8)
PROT UR STRIP-MCNC: ABNORMAL MG/DL
RBC #/AREA URNS HPF: ABNORMAL /HPF (ref 0–5)
SP GR UR REFRACTOMETRY: 1.01 (ref 1–1.03)
URINE CULTURE IF INDICATED: ABNORMAL
UROBILINOGEN UR QL STRIP.AUTO: 0.2 EU/DL (ref 0.2–1)
WBC URNS QL MICRO: >100 /HPF (ref 0–4)

## 2024-11-07 PROCEDURE — 87186 SC STD MICRODIL/AGAR DIL: CPT

## 2024-11-07 PROCEDURE — 81001 URINALYSIS AUTO W/SCOPE: CPT

## 2024-11-07 PROCEDURE — 87086 URINE CULTURE/COLONY COUNT: CPT

## 2024-11-07 PROCEDURE — 87088 URINE BACTERIA CULTURE: CPT

## 2024-11-07 PROCEDURE — 6370000000 HC RX 637 (ALT 250 FOR IP): Performed by: EMERGENCY MEDICINE

## 2024-11-07 PROCEDURE — 99283 EMERGENCY DEPT VISIT LOW MDM: CPT

## 2024-11-07 PROCEDURE — 51702 INSERT TEMP BLADDER CATH: CPT

## 2024-11-07 RX ORDER — CEFDINIR 300 MG/1
300 CAPSULE ORAL 2 TIMES DAILY
Qty: 14 CAPSULE | Refills: 0 | Status: SHIPPED | OUTPATIENT
Start: 2024-11-07 | End: 2024-11-14

## 2024-11-07 RX ORDER — LIDOCAINE HYDROCHLORIDE 20 MG/ML
JELLY TOPICAL PRN
Status: DISCONTINUED | OUTPATIENT
Start: 2024-11-07 | End: 2024-11-07 | Stop reason: HOSPADM

## 2024-11-07 RX ORDER — CEFDINIR 300 MG/1
300 CAPSULE ORAL
Status: COMPLETED | OUTPATIENT
Start: 2024-11-07 | End: 2024-11-07

## 2024-11-07 RX ADMIN — LIDOCAINE HYDROCHLORIDE: 20 JELLY TOPICAL at 03:33

## 2024-11-07 RX ADMIN — CEFDINIR 300 MG: 300 CAPSULE ORAL at 04:33

## 2024-11-07 ASSESSMENT — PAIN DESCRIPTION - FREQUENCY: FREQUENCY: CONTINUOUS

## 2024-11-07 ASSESSMENT — ENCOUNTER SYMPTOMS
ABDOMINAL PAIN: 1
DIARRHEA: 0
NAUSEA: 0
VOMITING: 0

## 2024-11-07 ASSESSMENT — PAIN DESCRIPTION - LOCATION
LOCATION: ABDOMEN
LOCATION: ABDOMEN

## 2024-11-07 ASSESSMENT — PAIN SCALES - GENERAL
PAINLEVEL_OUTOF10: 1
PAINLEVEL_OUTOF10: 1
PAINLEVEL_OUTOF10: 0
PAINLEVEL_OUTOF10: 0

## 2024-11-07 ASSESSMENT — PAIN - FUNCTIONAL ASSESSMENT
PAIN_FUNCTIONAL_ASSESSMENT: ACTIVITIES ARE NOT PREVENTED
PAIN_FUNCTIONAL_ASSESSMENT: ACTIVITIES ARE NOT PREVENTED
PAIN_FUNCTIONAL_ASSESSMENT: 0-10

## 2024-11-07 ASSESSMENT — PAIN DESCRIPTION - ORIENTATION
ORIENTATION: LOWER
ORIENTATION: LOWER

## 2024-11-07 ASSESSMENT — PAIN DESCRIPTION - PAIN TYPE: TYPE: ACUTE PAIN

## 2024-11-07 ASSESSMENT — PAIN DESCRIPTION - DESCRIPTORS
DESCRIPTORS: DULL
DESCRIPTORS: DULL

## 2024-11-07 ASSESSMENT — LIFESTYLE VARIABLES
HOW MANY STANDARD DRINKS CONTAINING ALCOHOL DO YOU HAVE ON A TYPICAL DAY: 1 OR 2
HOW OFTEN DO YOU HAVE A DRINK CONTAINING ALCOHOL: 4 OR MORE TIMES A WEEK

## 2024-11-07 ASSESSMENT — PAIN DESCRIPTION - ONSET: ONSET: ON-GOING

## 2024-11-07 NOTE — ED NOTES
Urine flowing freely from bermudez catheter. Pt requested to leave drainage bag on instead of legbag. Emptied 150 ml of cloudy yellow/pink tinged urine. Denies pain. Assisted into clothing.

## 2024-11-07 NOTE — ED PROVIDER NOTES
(VYTORIN) 10-40 MG per tablet Take 1 tablet by mouth daily      finasteride (PROSCAR) 5 MG tablet Take 5 mg by mouth daily      hydrocortisone 2.5 % cream Apply topically as needed      lisinopril (PRINIVIL;ZESTRIL) 20 MG tablet Take 20 mg by mouth daily      polyethyl glycol-propyl glycol 0.4-0.3 % (SYSTANE) 0.4-0.3 % ophthalmic solution as needed      raNITIdine (ZANTAC) 300 MG tablet Take 300 mg by mouth daily      rivastigmine (EXELON) 9.5 MG/24HR Place 1 patch onto the skin daily      tamsulosin (FLOMAX) 0.4 MG capsule Take 0.4 mg by mouth daily             SCREENINGS               No data recorded         PHYSICAL EXAM      Vitals:    11/07/24 0255 11/07/24 0333   BP: (!) 140/68 137/60   Pulse: 91    Resp: 20    Temp: 98.5 °F (36.9 °C)    TempSrc: Oral    SpO2: 95% 93%   Weight: 83 kg (183 lb)    Height: 1.676 m (5' 6\")         Physical Exam  Vitals and nursing note reviewed.   Constitutional:       General: He is not in acute distress.     Appearance: Normal appearance. He is normal weight. He is not toxic-appearing.   HENT:      Head: Normocephalic and atraumatic.   Eyes:      Extraocular Movements: Extraocular movements intact.      Conjunctiva/sclera: Conjunctivae normal.      Pupils: Pupils are equal, round, and reactive to light.   Cardiovascular:      Rate and Rhythm: Normal rate and regular rhythm.      Pulses: Normal pulses.   Pulmonary:      Effort: Pulmonary effort is normal.   Abdominal:      General: Abdomen is flat. There is no distension.      Palpations: There is no mass or pulsatile mass.      Tenderness: There is abdominal tenderness in the suprapubic area. There is no guarding or rebound. Negative signs include Marquez's sign, Rovsing's sign and McBurney's sign.      Hernia: No hernia is present.   Musculoskeletal:         General: No tenderness. Normal range of motion.      Cervical back: Normal range of motion and neck supple. No rigidity.   Skin:     General: Skin is warm and dry.

## 2024-11-07 NOTE — ED NOTES
Rosales placed and draining without complication. Urine output 400 ml.  Pt with immediate relieve of suprapubic abdominal pain.

## 2024-11-09 LAB
BACTERIA SPEC CULT: ABNORMAL
CC UR VC: ABNORMAL
SERVICE CMNT-IMP: ABNORMAL

## 2024-12-18 ENCOUNTER — HOSPITAL ENCOUNTER (EMERGENCY)
Facility: HOSPITAL | Age: 84
Discharge: HOME | End: 2024-12-18
Attending: EMERGENCY MEDICINE
Payer: MEDICARE

## 2024-12-18 VITALS
HEIGHT: 66 IN | BODY MASS INDEX: 29.02 KG/M2 | SYSTOLIC BLOOD PRESSURE: 153 MMHG | DIASTOLIC BLOOD PRESSURE: 70 MMHG | RESPIRATION RATE: 17 BRPM | OXYGEN SATURATION: 94 % | WEIGHT: 180.6 LBS | TEMPERATURE: 98.4 F | HEART RATE: 74 BPM

## 2024-12-18 DIAGNOSIS — Z98.890 S/P UROLOGICAL SURGERY: ICD-10-CM

## 2024-12-18 DIAGNOSIS — R31.0 GROSS HEMATURIA: Primary | ICD-10-CM

## 2024-12-18 DIAGNOSIS — N39.0 URINARY TRACT INFECTION ASSOCIATED WITH INDWELLING URETHRAL CATHETER, INITIAL ENCOUNTER (HCC): ICD-10-CM

## 2024-12-18 DIAGNOSIS — T83.511A URINARY TRACT INFECTION ASSOCIATED WITH INDWELLING URETHRAL CATHETER, INITIAL ENCOUNTER (HCC): ICD-10-CM

## 2024-12-18 LAB
ALBUMIN SERPL-MCNC: 2.9 G/DL (ref 3.5–5)
ALBUMIN/GLOB SERPL: 0.9 (ref 1.1–2.2)
ALP SERPL-CCNC: 70 U/L (ref 45–117)
ALT SERPL-CCNC: 21 U/L (ref 12–78)
ANION GAP SERPL CALC-SCNC: 7 MMOL/L (ref 2–12)
APPEARANCE UR: ABNORMAL
APTT PPP: 26 SEC (ref 22.1–31)
AST SERPL-CCNC: 16 U/L (ref 15–37)
BACTERIA URNS QL MICRO: ABNORMAL /HPF
BASOPHILS # BLD: 0 K/UL (ref 0–0.1)
BASOPHILS NFR BLD: 0 % (ref 0–1)
BILIRUB SERPL-MCNC: 0.4 MG/DL (ref 0.2–1)
BILIRUB UR QL CFM: NEGATIVE
BUN SERPL-MCNC: 21 MG/DL (ref 6–20)
BUN/CREAT SERPL: 17 (ref 12–20)
CALCIUM SERPL-MCNC: 8.6 MG/DL (ref 8.5–10.1)
CHLORIDE SERPL-SCNC: 104 MMOL/L (ref 97–108)
CO2 SERPL-SCNC: 29 MMOL/L (ref 21–32)
COLOR UR: ABNORMAL
CREAT SERPL-MCNC: 1.21 MG/DL (ref 0.7–1.3)
DIFFERENTIAL METHOD BLD: ABNORMAL
EOSINOPHIL # BLD: 0.2 K/UL (ref 0–0.4)
EOSINOPHIL NFR BLD: 3 % (ref 0–7)
EPITH CASTS URNS QL MICRO: ABNORMAL /LPF
ERYTHROCYTE [DISTWIDTH] IN BLOOD BY AUTOMATED COUNT: 12.2 % (ref 11.5–14.5)
GLOBULIN SER CALC-MCNC: 3.1 G/DL (ref 2–4)
GLUCOSE SERPL-MCNC: 119 MG/DL (ref 65–100)
GLUCOSE UR STRIP.AUTO-MCNC: 100 MG/DL
HCT VFR BLD AUTO: 38.3 % (ref 36.6–50.3)
HGB BLD-MCNC: 12.8 G/DL (ref 12.1–17)
HGB UR QL STRIP: ABNORMAL
IMM GRANULOCYTES # BLD AUTO: 0 K/UL (ref 0–0.04)
IMM GRANULOCYTES NFR BLD AUTO: 0 % (ref 0–0.5)
INR PPP: 1 (ref 0.9–1.1)
KETONES UR QL STRIP.AUTO: ABNORMAL MG/DL
LEUKOCYTE ESTERASE UR QL STRIP.AUTO: ABNORMAL
LYMPHOCYTES # BLD: 0.7 K/UL (ref 0.8–3.5)
LYMPHOCYTES NFR BLD: 11 % (ref 12–49)
MCH RBC QN AUTO: 33 PG (ref 26–34)
MCHC RBC AUTO-ENTMCNC: 33.4 G/DL (ref 30–36.5)
MCV RBC AUTO: 98.7 FL (ref 80–99)
MONOCYTES # BLD: 0.7 K/UL (ref 0–1)
MONOCYTES NFR BLD: 11 % (ref 5–13)
NEUTS SEG # BLD: 5 K/UL (ref 1.8–8)
NEUTS SEG NFR BLD: 75 % (ref 32–75)
NITRITE UR QL STRIP.AUTO: POSITIVE
NRBC # BLD: 0 K/UL (ref 0–0.01)
NRBC BLD-RTO: 0 PER 100 WBC
PH UR STRIP: 6.5 (ref 5–8)
PLATELET # BLD AUTO: 157 K/UL (ref 150–400)
PMV BLD AUTO: 9.4 FL (ref 8.9–12.9)
POTASSIUM SERPL-SCNC: 4.2 MMOL/L (ref 3.5–5.1)
PROT SERPL-MCNC: 6 G/DL (ref 6.4–8.2)
PROT UR STRIP-MCNC: >300 MG/DL
PROTHROMBIN TIME: 10.3 SEC (ref 9–11.1)
RBC # BLD AUTO: 3.88 M/UL (ref 4.1–5.7)
RBC #/AREA URNS HPF: >100 /HPF (ref 0–5)
RBC MORPH BLD: ABNORMAL
RBC MORPH BLD: ABNORMAL
SODIUM SERPL-SCNC: 140 MMOL/L (ref 136–145)
SP GR UR REFRACTOMETRY: 1 (ref 1–1.03)
THERAPEUTIC RANGE: NORMAL SECS (ref 58–77)
URINE CULTURE IF INDICATED: ABNORMAL
UROBILINOGEN UR QL STRIP.AUTO: 1 EU/DL (ref 0.2–1)
WBC # BLD AUTO: 6.6 K/UL (ref 4.1–11.1)
WBC URNS QL MICRO: ABNORMAL /HPF (ref 0–4)

## 2024-12-18 PROCEDURE — 36415 COLL VENOUS BLD VENIPUNCTURE: CPT

## 2024-12-18 PROCEDURE — 99283 EMERGENCY DEPT VISIT LOW MDM: CPT

## 2024-12-18 PROCEDURE — 85730 THROMBOPLASTIN TIME PARTIAL: CPT

## 2024-12-18 PROCEDURE — 6370000000 HC RX 637 (ALT 250 FOR IP): Performed by: EMERGENCY MEDICINE

## 2024-12-18 PROCEDURE — 85025 COMPLETE CBC W/AUTO DIFF WBC: CPT

## 2024-12-18 PROCEDURE — 80053 COMPREHEN METABOLIC PANEL: CPT

## 2024-12-18 PROCEDURE — 87086 URINE CULTURE/COLONY COUNT: CPT

## 2024-12-18 PROCEDURE — 81001 URINALYSIS AUTO W/SCOPE: CPT

## 2024-12-18 PROCEDURE — 85610 PROTHROMBIN TIME: CPT

## 2024-12-18 RX ORDER — OXYBUTYNIN CHLORIDE 5 MG/1
5 TABLET ORAL 3 TIMES DAILY
COMMUNITY

## 2024-12-18 RX ORDER — KETOROLAC TROMETHAMINE 10 MG/1
10 TABLET, FILM COATED ORAL EVERY 8 HOURS PRN
COMMUNITY

## 2024-12-18 RX ORDER — CIPROFLOXACIN 500 MG/1
500 TABLET, FILM COATED ORAL
Status: COMPLETED | OUTPATIENT
Start: 2024-12-18 | End: 2024-12-18

## 2024-12-18 RX ADMIN — CIPROFLOXACIN 500 MG: 500 TABLET, FILM COATED ORAL at 04:25

## 2024-12-18 ASSESSMENT — ENCOUNTER SYMPTOMS: BACK PAIN: 0

## 2024-12-18 ASSESSMENT — PAIN SCALES - GENERAL: PAINLEVEL_OUTOF10: 0

## 2024-12-18 NOTE — ED NOTES
Irrigated bladder with NS 40 ml - red urine output, cloudy with blood clots. Deflated catheter balloon and readjusted catheter to decrease bleeding around penis. Reinflated balloon with 30 ml as directed on catheter. Pt tolerated without difficulty.

## 2024-12-18 NOTE — ED PROVIDER NOTES
[facility] EMERGENCY DEP  EMERGENCY DEPARTMENT ENCOUNTER       Pt Name: Bobby Narvaez  MRN: [id number]  Birthdate 1940  Date of evaluation: 12/18/2024  Provider: Demetrius Fischer MD   PCP: Christian Ruff MD  Note Started: 4:20 AM 12/18/24      FINAL IMPRESSION     1. Gross hematuria    2. S/P urological surgery    3. Urinary tract infection associated with indwelling urethral catheter, initial encounter (HCC)          DISPOSITION/PLAN     Disposition:  DISPOSITION Decision To Discharge 12/18/2024 04:19:01 AM   DISPOSITION CONDITION Stable           Discharge Note: The patient is stable for discharge home. The signs, symptoms, diagnosis, and discharge instructions have been discussed, understanding conveyed, and agreed upon. The patient is to follow up as recommended or return to ER should their symptoms worsen.      PATIENT REFERRED TO:  Christian Ruff MD  [address]  [facility] [address] [phone]      As needed    David Alvarado MD  [address] [facility] [address] [phone]    Go today  For Follow Up            DISCHARGE MEDICATIONS:     Medication List        ASK your doctor about these medications      acetaminophen 650 MG extended release tablet  Commonly known as: TYLENOL     aspirin 81 MG EC tablet     buPROPion 150 MG extended release tablet  Commonly known as: WELLBUTRIN SR     coenzyme Q10 100 MG Caps capsule     cyanocobalamin 1000 MCG tablet     ezetimibe-simvastatin 10-40 MG per tablet  Commonly known as: VYTORIN     finasteride 5 MG tablet  Commonly known as: PROSCAR     hydrocortisone 2.5 % cream     ketorolac 10 MG tablet  Commonly known as: TORADOL     lisinopril 20 MG tablet  Commonly known as: PRINIVIL;ZESTRIL     oxyBUTYnin 5 MG tablet  Commonly known as: DITROPAN     polyethyl glycol-propyl glycol 0.4-0.3 % 0.4-0.3 % ophthalmic solution  Commonly known as: SYSTANE     raNITIdine 300 MG tablet  Commonly known as: ZANTAC     rivastigmine 9.5 MG/24HR  Commonly known as:

## 2024-12-18 NOTE — ED TRIAGE NOTES
Pt reports that he has had bleeding around catheter and in catheter bag since 2300 tonight. Pt had prostate optilume surgery on Tuesday. Denies pain.

## 2024-12-18 NOTE — ED NOTES
Pt reports that he feels like his bladder is full. Bladder scanned 71 ml. Pt passed a large clot of blood during bladder scanning and pt reports relief of the feeling of his bladder being full. MD aware.

## 2024-12-18 NOTE — ED NOTES
Irrigated bladder with  ml - large amount of clots passed. Pt then able to void clear cherry colored urine. Bleeding around penis noted - readjusted balloon and gently pulled catheter leonor taut to stop the bleeding. Then used securing device to left thigh. Pt tolerated without difficulty.

## 2024-12-19 LAB
BACTERIA SPEC CULT: NORMAL
SERVICE CMNT-IMP: NORMAL

## 2025-02-14 ENCOUNTER — TRANSCRIBE ORDERS (OUTPATIENT)
Facility: HOSPITAL | Age: 85
End: 2025-02-14

## 2025-02-14 DIAGNOSIS — M81.0 AGE-RELATED OSTEOPOROSIS WITHOUT CURRENT PATHOLOGICAL FRACTURE: Primary | ICD-10-CM

## 2025-03-25 ENCOUNTER — TRANSCRIBE ORDERS (OUTPATIENT)
Facility: HOSPITAL | Age: 85
End: 2025-03-25

## 2025-03-25 DIAGNOSIS — M79.89 OTHER SPECIFIED SOFT TISSUE DISORDERS: Primary | ICD-10-CM

## 2025-03-26 ENCOUNTER — HOSPITAL ENCOUNTER (OUTPATIENT)
Facility: HOSPITAL | Age: 85
Discharge: HOME | End: 2025-03-29
Payer: MEDICARE

## 2025-03-26 DIAGNOSIS — M79.89 OTHER SPECIFIED SOFT TISSUE DISORDERS: ICD-10-CM

## 2025-03-26 PROCEDURE — 93971 EXTREMITY STUDY: CPT

## 2025-04-09 ENCOUNTER — HOSPITAL ENCOUNTER (EMERGENCY)
Facility: HOSPITAL | Age: 85
Discharge: HOME | End: 2025-04-09
Attending: FAMILY MEDICINE
Payer: MEDICARE

## 2025-04-09 VITALS
WEIGHT: 186.7 LBS | SYSTOLIC BLOOD PRESSURE: 150 MMHG | RESPIRATION RATE: 17 BRPM | OXYGEN SATURATION: 96 % | HEIGHT: 65 IN | HEART RATE: 99 BPM | TEMPERATURE: 98.8 F | BODY MASS INDEX: 31.11 KG/M2 | DIASTOLIC BLOOD PRESSURE: 77 MMHG

## 2025-04-09 DIAGNOSIS — E86.0 MILD DEHYDRATION: ICD-10-CM

## 2025-04-09 DIAGNOSIS — R33.8 ACUTE URINARY RETENTION: Primary | ICD-10-CM

## 2025-04-09 LAB
ALBUMIN SERPL-MCNC: 3.3 G/DL (ref 3.5–5)
ALBUMIN/GLOB SERPL: 1 (ref 1.1–2.2)
ALP SERPL-CCNC: 66 U/L (ref 45–117)
ALT SERPL-CCNC: 20 U/L (ref 12–78)
ANION GAP SERPL CALC-SCNC: 10 MMOL/L (ref 2–12)
APPEARANCE UR: CLEAR
AST SERPL-CCNC: 17 U/L (ref 15–37)
BACTERIA URNS QL MICRO: NEGATIVE /HPF
BASOPHILS # BLD: 0.02 K/UL (ref 0–0.1)
BASOPHILS NFR BLD: 0.4 % (ref 0–1)
BILIRUB SERPL-MCNC: 0.7 MG/DL (ref 0.2–1)
BILIRUB UR QL: NEGATIVE
BUN SERPL-MCNC: 16 MG/DL (ref 6–20)
BUN/CREAT SERPL: 16 (ref 12–20)
CALCIUM SERPL-MCNC: 8.4 MG/DL (ref 8.5–10.1)
CAOX CRY URNS QL MICRO: ABNORMAL
CHLORIDE SERPL-SCNC: 104 MMOL/L (ref 97–108)
CK SERPL-CCNC: 98 U/L (ref 39–308)
CO2 SERPL-SCNC: 25 MMOL/L (ref 21–32)
COLOR UR: ABNORMAL
CREAT SERPL-MCNC: 1 MG/DL (ref 0.7–1.3)
DIFFERENTIAL METHOD BLD: NORMAL
EOSINOPHIL # BLD: 0.11 K/UL (ref 0–0.4)
EOSINOPHIL NFR BLD: 2.4 % (ref 0–7)
EPITH CASTS URNS QL MICRO: ABNORMAL /LPF
ERYTHROCYTE [DISTWIDTH] IN BLOOD BY AUTOMATED COUNT: 12.6 % (ref 11.5–14.5)
GLOBULIN SER CALC-MCNC: 3.2 G/DL (ref 2–4)
GLUCOSE SERPL-MCNC: 126 MG/DL (ref 65–100)
GLUCOSE UR STRIP.AUTO-MCNC: NEGATIVE MG/DL
HCT VFR BLD AUTO: 42 % (ref 36.6–50.3)
HGB BLD-MCNC: 14.6 G/DL (ref 12.1–17)
HGB UR QL STRIP: NEGATIVE
IMM GRANULOCYTES # BLD AUTO: 0.01 K/UL (ref 0–0.04)
IMM GRANULOCYTES NFR BLD AUTO: 0.2 % (ref 0–0.5)
KETONES UR QL STRIP.AUTO: 15 MG/DL
LEUKOCYTE ESTERASE UR QL STRIP.AUTO: NEGATIVE
LYMPHOCYTES # BLD: 0.83 K/UL (ref 0.8–3.5)
LYMPHOCYTES NFR BLD: 17.9 % (ref 12–49)
MCH RBC QN AUTO: 32.7 PG (ref 26–34)
MCHC RBC AUTO-ENTMCNC: 34.8 G/DL (ref 30–36.5)
MCV RBC AUTO: 94.2 FL (ref 80–99)
MONOCYTES # BLD: 0.46 K/UL (ref 0–1)
MONOCYTES NFR BLD: 9.9 % (ref 5–13)
NEUTS SEG # BLD: 3.2 K/UL (ref 1.8–8)
NEUTS SEG NFR BLD: 69.2 % (ref 32–75)
NITRITE UR QL STRIP.AUTO: NEGATIVE
NRBC # BLD: 0 K/UL (ref 0–0.01)
NRBC BLD-RTO: 0 PER 100 WBC
PH UR STRIP: 6 (ref 5–8)
PLATELET # BLD AUTO: 179 K/UL (ref 150–400)
PMV BLD AUTO: 9.4 FL (ref 8.9–12.9)
POTASSIUM SERPL-SCNC: 3.9 MMOL/L (ref 3.5–5.1)
PROT SERPL-MCNC: 6.5 G/DL (ref 6.4–8.2)
PROT UR STRIP-MCNC: NEGATIVE MG/DL
RBC # BLD AUTO: 4.46 M/UL (ref 4.1–5.7)
RBC #/AREA URNS HPF: ABNORMAL /HPF (ref 0–5)
SODIUM SERPL-SCNC: 139 MMOL/L (ref 136–145)
SP GR UR REFRACTOMETRY: 1.01 (ref 1–1.03)
URINE CULTURE IF INDICATED: ABNORMAL
UROBILINOGEN UR QL STRIP.AUTO: 0.2 EU/DL (ref 0.2–1)
WBC # BLD AUTO: 4.6 K/UL (ref 4.1–11.1)
WBC URNS QL MICRO: ABNORMAL /HPF (ref 0–4)

## 2025-04-09 PROCEDURE — 2580000003 HC RX 258: Performed by: FAMILY MEDICINE

## 2025-04-09 PROCEDURE — 36415 COLL VENOUS BLD VENIPUNCTURE: CPT

## 2025-04-09 PROCEDURE — 85025 COMPLETE CBC W/AUTO DIFF WBC: CPT

## 2025-04-09 PROCEDURE — 80053 COMPREHEN METABOLIC PANEL: CPT

## 2025-04-09 PROCEDURE — 99284 EMERGENCY DEPT VISIT MOD MDM: CPT

## 2025-04-09 PROCEDURE — 82550 ASSAY OF CK (CPK): CPT

## 2025-04-09 PROCEDURE — 96360 HYDRATION IV INFUSION INIT: CPT

## 2025-04-09 PROCEDURE — 6370000000 HC RX 637 (ALT 250 FOR IP): Performed by: FAMILY MEDICINE

## 2025-04-09 PROCEDURE — 81001 URINALYSIS AUTO W/SCOPE: CPT

## 2025-04-09 PROCEDURE — 87086 URINE CULTURE/COLONY COUNT: CPT

## 2025-04-09 RX ORDER — CIPROFLOXACIN 500 MG/1
500 TABLET, FILM COATED ORAL
Status: COMPLETED | OUTPATIENT
Start: 2025-04-09 | End: 2025-04-09

## 2025-04-09 RX ORDER — LIDOCAINE HYDROCHLORIDE 20 MG/ML
JELLY TOPICAL PRN
Status: DISCONTINUED | OUTPATIENT
Start: 2025-04-09 | End: 2025-04-09 | Stop reason: HOSPADM

## 2025-04-09 RX ORDER — 0.9 % SODIUM CHLORIDE 0.9 %
1000 INTRAVENOUS SOLUTION INTRAVENOUS ONCE
Status: COMPLETED | OUTPATIENT
Start: 2025-04-09 | End: 2025-04-09

## 2025-04-09 RX ORDER — CIPROFLOXACIN 500 MG/1
500 TABLET, FILM COATED ORAL 2 TIMES DAILY
Qty: 14 TABLET | Refills: 0 | Status: SHIPPED | OUTPATIENT
Start: 2025-04-09 | End: 2025-04-16

## 2025-04-09 RX ADMIN — SODIUM CHLORIDE 1000 ML: 0.9 INJECTION, SOLUTION INTRAVENOUS at 06:42

## 2025-04-09 RX ADMIN — LIDOCAINE HYDROCHLORIDE: 20 JELLY TOPICAL at 05:49

## 2025-04-09 RX ADMIN — CIPROFLOXACIN HYDROCHLORIDE 500 MG: 500 TABLET, FILM COATED ORAL at 06:51

## 2025-04-09 ASSESSMENT — PAIN DESCRIPTION - DESCRIPTORS
DESCRIPTORS: SPASM
DESCRIPTORS: SPASM
DESCRIPTORS: SORE

## 2025-04-09 ASSESSMENT — PAIN - FUNCTIONAL ASSESSMENT
PAIN_FUNCTIONAL_ASSESSMENT: PREVENTS OR INTERFERES WITH MANY ACTIVE NOT PASSIVE ACTIVITIES
PAIN_FUNCTIONAL_ASSESSMENT: NONE - DENIES PAIN
PAIN_FUNCTIONAL_ASSESSMENT: PREVENTS OR INTERFERES SOME ACTIVE ACTIVITIES AND ADLS
PAIN_FUNCTIONAL_ASSESSMENT: PREVENTS OR INTERFERES SOME ACTIVE ACTIVITIES AND ADLS
PAIN_FUNCTIONAL_ASSESSMENT: 0-10

## 2025-04-09 ASSESSMENT — PAIN DESCRIPTION - ORIENTATION
ORIENTATION: LOWER

## 2025-04-09 ASSESSMENT — PAIN DESCRIPTION - LOCATION
LOCATION: BACK;BUTTOCKS
LOCATION: ABDOMEN
LOCATION: BACK;BUTTOCKS

## 2025-04-09 ASSESSMENT — PAIN SCALES - GENERAL
PAINLEVEL_OUTOF10: 1
PAINLEVEL_OUTOF10: 3
PAINLEVEL_OUTOF10: 4
PAINLEVEL_OUTOF10: 0

## 2025-04-09 ASSESSMENT — PAIN DESCRIPTION - PAIN TYPE: TYPE: ACUTE PAIN

## 2025-04-09 ASSESSMENT — PAIN DESCRIPTION - ONSET: ONSET: ON-GOING

## 2025-04-09 ASSESSMENT — PAIN DESCRIPTION - FREQUENCY: FREQUENCY: CONTINUOUS

## 2025-04-09 NOTE — ED TRIAGE NOTES
Pt arrived via EMS f rom home with report of lower back pain/buttocks pain that started around 2200 tonight. Pt repots that the pain is so bad that he is unable to void or have a BM since the morning. States that he has similar matthew pain in the past and received steroids to relieve his pain. Abdomen distended. Pt reports redness ad sweling to LE's that Dr. Ruff is \"following\" per pt. Pt repots some cognitive impairment. Pt denies fall.

## 2025-04-09 NOTE — DISCHARGE INSTRUCTIONS
Leave catheter in place until removed by urology.  Return for fever malfunction of catheter or return of pain, if worse in any way.  Tylenol as needed for mild pain.  Cipro 500 mg twice a day for 7 days.  Contact urology today to determine when follow-up appointment should be done and to discuss when Rosales catheter could be removed.

## 2025-04-09 NOTE — ED NOTES
Urine output after bermudez placement of 1350 ml. Post void bermudez residual of 80 ml. MD informed. Pt denies back or buttocks pain after draining bladder. Abdomen without distension and soft. Reports soreness to lower abdomen after draining bladder. Pt now able to sit up to semi pereira position without back or buttocks pain.

## 2025-04-09 NOTE — ED PROVIDER NOTES
states that his back pain is resolved after catheterization.  Will hydrate with normal saline as patient clinically appears to be mildly dehydrated.  Blood pressures improved after pain relief. [PS]   0644 Urine culture in November of last year was positive for Pseudomonas sensitive to all, culture done in December was negative.  Will obtain urine culture today.  And start patient on Cipro. [PS]   0654 Sign Out 710 AM  Patient's presentation, labs/imaging and plan of care was reviewed with Dr. Tomas as part of sign out.  Plan is to discharge patient after hydration if stable with follow-up as noted.   [PS]   0738 Pt signed out to me at 0710. Waiting for fluids, then discharge. [VG]      ED Course User Index  [PS] Denys Triplett MD  [VG] Zulema Tomas MD       Disposition Considerations (Tests not done, Shared Decision Making, Pt Expectation of Test or Tx.): Considered x-rays but pain was resolved after decompression of bladder.  I feel that bladder distention was etiology of patient's rectal pressure and back pain.  Patient states that he feels comfortable going home and will follow-up with his urologist today.    FINAL IMPRESSION     1. Acute urinary retention    2. Mild dehydration          DISPOSITION/PLAN   DISPOSITION                 Discharge Note: The patient is stable for discharge home. The signs, symptoms, diagnosis, and discharge instructions have been discussed, understanding conveyed, and agreed upon. The patient is to follow up as recommended or return to ER should their symptoms worsen.      PATIENT REFERRED TO:  David Alvarado MD  [address] [place] [address] [phone]    Call today  follow up todays symptoms, ED visit, urine culture results, instructions for bermudez removal         DISCHARGE MEDICATIONS:     Medication List        START taking these medications      ciprofloxacin 500 MG tablet  Commonly known as: Cipro  Take 1 tablet by mouth 2 times daily for 7 days

## 2025-04-10 LAB
BACTERIA SPEC CULT: NORMAL
SERVICE CMNT-IMP: NORMAL

## 2025-04-25 ENCOUNTER — HOSPITAL ENCOUNTER (OUTPATIENT)
Facility: HOSPITAL | Age: 85
Discharge: HOME | End: 2025-04-28
Payer: MEDICARE

## 2025-04-25 DIAGNOSIS — M81.0 AGE-RELATED OSTEOPOROSIS WITHOUT CURRENT PATHOLOGICAL FRACTURE: ICD-10-CM

## 2025-04-25 PROCEDURE — 77080 DXA BONE DENSITY AXIAL: CPT

## 2025-05-31 ENCOUNTER — HOSPITAL ENCOUNTER (EMERGENCY)
Facility: HOSPITAL | Age: 85
Discharge: HOME | End: 2025-05-31
Attending: EMERGENCY MEDICINE
Payer: MEDICARE

## 2025-05-31 ENCOUNTER — APPOINTMENT (OUTPATIENT)
Facility: HOSPITAL | Age: 85
End: 2025-05-31
Payer: MEDICARE

## 2025-05-31 VITALS
TEMPERATURE: 98.2 F | WEIGHT: 168 LBS | OXYGEN SATURATION: 94 % | RESPIRATION RATE: 18 BRPM | SYSTOLIC BLOOD PRESSURE: 169 MMHG | DIASTOLIC BLOOD PRESSURE: 67 MMHG | HEART RATE: 70 BPM | HEIGHT: 66 IN | BODY MASS INDEX: 27 KG/M2

## 2025-05-31 DIAGNOSIS — I10 PRIMARY HYPERTENSION: ICD-10-CM

## 2025-05-31 DIAGNOSIS — R10.30 LOWER ABDOMINAL PAIN: Primary | ICD-10-CM

## 2025-05-31 DIAGNOSIS — R33.9 URINARY RETENTION: ICD-10-CM

## 2025-05-31 LAB
ALBUMIN SERPL-MCNC: 3.4 G/DL (ref 3.5–5)
ALBUMIN/GLOB SERPL: 1.1 (ref 1.1–2.2)
ALP SERPL-CCNC: 59 U/L (ref 45–117)
ALT SERPL-CCNC: 19 U/L (ref 12–78)
ANION GAP SERPL CALC-SCNC: 10 MMOL/L (ref 2–12)
APPEARANCE UR: CLEAR
AST SERPL-CCNC: 14 U/L (ref 15–37)
BACTERIA URNS QL MICRO: NEGATIVE /HPF
BASOPHILS # BLD: 0.02 K/UL (ref 0–0.1)
BASOPHILS NFR BLD: 0.3 % (ref 0–1)
BILIRUB SERPL-MCNC: 0.7 MG/DL (ref 0.2–1)
BILIRUB UR QL: NEGATIVE
BUN SERPL-MCNC: 19 MG/DL (ref 6–20)
BUN/CREAT SERPL: 19 (ref 12–20)
CALCIUM SERPL-MCNC: 9.2 MG/DL (ref 8.5–10.1)
CHLORIDE SERPL-SCNC: 103 MMOL/L (ref 97–108)
CO2 SERPL-SCNC: 26 MMOL/L (ref 21–32)
COLOR UR: ABNORMAL
CREAT SERPL-MCNC: 1.01 MG/DL (ref 0.7–1.3)
DIFFERENTIAL METHOD BLD: ABNORMAL
EKG ATRIAL RATE: 83 BPM
EKG DIAGNOSIS: NORMAL
EKG P AXIS: 30 DEGREES
EKG P-R INTERVAL: 184 MS
EKG Q-T INTERVAL: 408 MS
EKG QRS DURATION: 130 MS
EKG QTC CALCULATION (BAZETT): 479 MS
EKG R AXIS: 68 DEGREES
EKG T AXIS: -9 DEGREES
EKG VENTRICULAR RATE: 83 BPM
EOSINOPHIL # BLD: 0.02 K/UL (ref 0–0.4)
EOSINOPHIL NFR BLD: 0.3 % (ref 0–7)
EPITH CASTS URNS QL MICRO: ABNORMAL /LPF
ERYTHROCYTE [DISTWIDTH] IN BLOOD BY AUTOMATED COUNT: 12.6 % (ref 11.5–14.5)
GLOBULIN SER CALC-MCNC: 3.2 G/DL (ref 2–4)
GLUCOSE SERPL-MCNC: 119 MG/DL (ref 65–100)
GLUCOSE UR STRIP.AUTO-MCNC: NEGATIVE MG/DL
HCT VFR BLD AUTO: 43.2 % (ref 36.6–50.3)
HGB BLD-MCNC: 14.9 G/DL (ref 12.1–17)
HGB UR QL STRIP: NEGATIVE
IMM GRANULOCYTES # BLD AUTO: 0.01 K/UL (ref 0–0.04)
IMM GRANULOCYTES NFR BLD AUTO: 0.2 % (ref 0–0.5)
KETONES UR QL STRIP.AUTO: ABNORMAL MG/DL
LACTATE SERPL-SCNC: 0.7 MMOL/L (ref 0.4–2)
LEUKOCYTE ESTERASE UR QL STRIP.AUTO: NEGATIVE
LIPASE SERPL-CCNC: 30 U/L (ref 13–75)
LYMPHOCYTES # BLD: 0.46 K/UL (ref 0.8–3.5)
LYMPHOCYTES NFR BLD: 8 % (ref 12–49)
MAGNESIUM SERPL-MCNC: 1.9 MG/DL (ref 1.6–2.4)
MCH RBC QN AUTO: 33.3 PG (ref 26–34)
MCHC RBC AUTO-ENTMCNC: 34.5 G/DL (ref 30–36.5)
MCV RBC AUTO: 96.4 FL (ref 80–99)
MONOCYTES # BLD: 0.36 K/UL (ref 0–1)
MONOCYTES NFR BLD: 6.3 % (ref 5–13)
NEUTS SEG # BLD: 4.84 K/UL (ref 1.8–8)
NEUTS SEG NFR BLD: 84.9 % (ref 32–75)
NITRITE UR QL STRIP.AUTO: NEGATIVE
NRBC # BLD: 0 K/UL (ref 0–0.01)
NRBC BLD-RTO: 0 PER 100 WBC
PH UR STRIP: 6.5 (ref 5–8)
PLATELET # BLD AUTO: 166 K/UL (ref 150–400)
PMV BLD AUTO: 9.8 FL (ref 8.9–12.9)
POTASSIUM SERPL-SCNC: 4 MMOL/L (ref 3.5–5.1)
PROT SERPL-MCNC: 6.6 G/DL (ref 6.4–8.2)
PROT UR STRIP-MCNC: NEGATIVE MG/DL
RBC # BLD AUTO: 4.48 M/UL (ref 4.1–5.7)
RBC #/AREA URNS HPF: ABNORMAL /HPF (ref 0–5)
RBC MORPH BLD: ABNORMAL
RBC MORPH BLD: ABNORMAL
SODIUM SERPL-SCNC: 139 MMOL/L (ref 136–145)
SP GR UR REFRACTOMETRY: 1.01 (ref 1–1.03)
TROPONIN I SERPL HS-MCNC: 27 NG/L (ref 0–76)
URINE CULTURE IF INDICATED: ABNORMAL
UROBILINOGEN UR QL STRIP.AUTO: 0.2 EU/DL (ref 0.2–1)
WBC # BLD AUTO: 5.7 K/UL (ref 4.1–11.1)
WBC URNS QL MICRO: ABNORMAL /HPF (ref 0–4)

## 2025-05-31 PROCEDURE — 6360000002 HC RX W HCPCS: Performed by: EMERGENCY MEDICINE

## 2025-05-31 PROCEDURE — 83690 ASSAY OF LIPASE: CPT

## 2025-05-31 PROCEDURE — 74177 CT ABD & PELVIS W/CONTRAST: CPT

## 2025-05-31 PROCEDURE — 6360000004 HC RX CONTRAST MEDICATION: Performed by: EMERGENCY MEDICINE

## 2025-05-31 PROCEDURE — 96374 THER/PROPH/DIAG INJ IV PUSH: CPT

## 2025-05-31 PROCEDURE — 83605 ASSAY OF LACTIC ACID: CPT

## 2025-05-31 PROCEDURE — 99285 EMERGENCY DEPT VISIT HI MDM: CPT

## 2025-05-31 PROCEDURE — 80053 COMPREHEN METABOLIC PANEL: CPT

## 2025-05-31 PROCEDURE — 36415 COLL VENOUS BLD VENIPUNCTURE: CPT

## 2025-05-31 PROCEDURE — 6370000000 HC RX 637 (ALT 250 FOR IP): Performed by: EMERGENCY MEDICINE

## 2025-05-31 PROCEDURE — 81001 URINALYSIS AUTO W/SCOPE: CPT

## 2025-05-31 PROCEDURE — 93005 ELECTROCARDIOGRAM TRACING: CPT | Performed by: EMERGENCY MEDICINE

## 2025-05-31 PROCEDURE — 83735 ASSAY OF MAGNESIUM: CPT

## 2025-05-31 PROCEDURE — 84484 ASSAY OF TROPONIN QUANT: CPT

## 2025-05-31 PROCEDURE — 85025 COMPLETE CBC W/AUTO DIFF WBC: CPT

## 2025-05-31 PROCEDURE — 51798 US URINE CAPACITY MEASURE: CPT

## 2025-05-31 RX ORDER — CEPHALEXIN 500 MG/1
500 CAPSULE ORAL 3 TIMES DAILY
Qty: 21 CAPSULE | Refills: 0 | Status: SHIPPED | OUTPATIENT
Start: 2025-05-31 | End: 2025-06-07

## 2025-05-31 RX ORDER — IOPAMIDOL 755 MG/ML
100 INJECTION, SOLUTION INTRAVASCULAR
Status: COMPLETED | OUTPATIENT
Start: 2025-05-31 | End: 2025-05-31

## 2025-05-31 RX ORDER — LABETALOL HYDROCHLORIDE 5 MG/ML
10 INJECTION, SOLUTION INTRAVENOUS
Status: COMPLETED | OUTPATIENT
Start: 2025-05-31 | End: 2025-05-31

## 2025-05-31 RX ADMIN — LABETALOL HYDROCHLORIDE 10 MG: 5 INJECTION, SOLUTION INTRAVENOUS at 20:56

## 2025-05-31 RX ADMIN — CEPHALEXIN 500 MG: 250 CAPSULE ORAL at 21:50

## 2025-05-31 RX ADMIN — IOPAMIDOL 100 ML: 755 INJECTION, SOLUTION INTRAVENOUS at 20:54

## 2025-05-31 ASSESSMENT — PAIN SCALES - GENERAL
PAINLEVEL_OUTOF10: 0
PAINLEVEL_OUTOF10: 2
PAINLEVEL_OUTOF10: 0

## 2025-05-31 ASSESSMENT — PAIN DESCRIPTION - LOCATION: LOCATION: ABDOMEN

## 2025-05-31 ASSESSMENT — ENCOUNTER SYMPTOMS
CONSTIPATION: 0
BLOOD IN STOOL: 0
ABDOMINAL PAIN: 1
DIARRHEA: 0
SHORTNESS OF BREATH: 0
VOMITING: 0
NAUSEA: 1

## 2025-05-31 ASSESSMENT — LIFESTYLE VARIABLES
HOW OFTEN DO YOU HAVE A DRINK CONTAINING ALCOHOL: 4 OR MORE TIMES A WEEK
HOW MANY STANDARD DRINKS CONTAINING ALCOHOL DO YOU HAVE ON A TYPICAL DAY: 1 OR 2

## 2025-05-31 ASSESSMENT — PAIN DESCRIPTION - DESCRIPTORS: DESCRIPTORS: DISCOMFORT

## 2025-05-31 ASSESSMENT — PAIN - FUNCTIONAL ASSESSMENT: PAIN_FUNCTIONAL_ASSESSMENT: 0-10

## 2025-05-31 ASSESSMENT — PAIN DESCRIPTION - ORIENTATION: ORIENTATION: MID

## 2025-05-31 NOTE — ED PROVIDER NOTES
[facility] EMERGENCY DEPARTMENT  EMERGENCY DEPARTMENT ENCOUNTER       Pt Name: Bobby Narvaez  MRN: [id number]  Birthdate 1940  Date of evaluation: 5/31/2025  Provider: Demetrius Fischer MD   PCP: Christian Ruff MD  Note Started: 9:47 PM 5/31/25      FINAL IMPRESSION     1. Lower abdominal pain    2. Urinary retention    3. Primary hypertension          DISPOSITION/PLAN     Disposition:  DISPOSITION Decision To Discharge 05/31/2025 09:44:41 PM   DISPOSITION CONDITION Stable           Discharge Note: The patient is stable for discharge home. The signs, symptoms, diagnosis, and discharge instructions have been discussed, understanding conveyed, and agreed upon. The patient is to follow up as recommended or return to ER should their symptoms worsen.      PATIENT REFERRED TO:  Christian Ruff MD   [address]  [facility] [address] [phone]      As needed    Grayson Kelsey MD  [address] [facility] [address] [phone]    Schedule an appointment as soon as possible for a visit in 3 days  For Follow Up            DISCHARGE MEDICATIONS:     Medication List        START taking these medications      cephALEXin 500 MG capsule  Commonly known as: KEFLEX  Take 1 capsule by mouth 3 times daily for 7 days            ASK your doctor about these medications      acetaminophen 650 MG extended release tablet  Commonly known as: TYLENOL     aspirin 81 MG EC tablet     buPROPion 150 MG extended release tablet  Commonly known as: WELLBUTRIN SR     coenzyme Q10 100 MG Caps capsule     cyanocobalamin 1000 MCG tablet     ezetimibe-simvastatin 10-40 MG per tablet  Commonly known as: VYTORIN     finasteride 5 MG tablet  Commonly known as: PROSCAR     hydrocortisone 2.5 % cream     ketorolac 10 MG tablet  Commonly known as: TORADOL     lisinopril 20 MG tablet  Commonly known as: PRINIVIL;ZESTRIL     oxyBUTYnin 5 MG tablet  Commonly known as: DITROPAN     polyethyl glycol-propyl glycol 0.4-0.3 %

## 2025-06-01 NOTE — DISCHARGE INSTRUCTIONS
Monitor your blood pressure at home if blood pressures continue to be elevated discussed with PCP further management and changes to your medication regimen

## 2025-06-03 LAB
EKG ATRIAL RATE: 83 BPM
EKG DIAGNOSIS: NORMAL
EKG P AXIS: 30 DEGREES
EKG P-R INTERVAL: 184 MS
EKG Q-T INTERVAL: 408 MS
EKG QRS DURATION: 130 MS
EKG QTC CALCULATION (BAZETT): 479 MS
EKG R AXIS: 68 DEGREES
EKG T AXIS: -9 DEGREES
EKG VENTRICULAR RATE: 83 BPM

## 2025-06-05 NOTE — ED TRIAGE NOTES
Pt reports urinary frequency since yesterday around 1800. Pt reports that he had a cystoscope on Tuesday by Dr. Garcia at [facility] for his BPH. Pt had catheter removed on Tuesday and pt reports urinary frequency upon getting home Tuesday, got better then returned last night. Denies blood in urine. Reports mild burning with urination and low abdominal pain.  
Detail Level: Zone
Barcode: [id number]

## (undated) DEVICE — TOWEL 4 PLY TISS 19X30 SUE WHT

## (undated) DEVICE — CONTAINER SPEC 20 ML LID NEUT BUFF FORMALIN 10 % POLYPR STS

## (undated) DEVICE — SYR 10ML LUER LOK 1/5ML GRAD --

## (undated) DEVICE — Z DISCONTINUED PER MEDLINE LINE GAS SAMPLING O2/CO2 LNG AD 13 FT NSL W/ TBNG FILTERLINE

## (undated) DEVICE — SOLIDIFIER FLD 2OZ 1500CC N DISINF IN BTL DISP SAFESORB

## (undated) DEVICE — STRAINER URIN CALC RNL MSH -- CONVERT TO ITEM 357634

## (undated) DEVICE — BASIN EMSIS 16OZ GRAPHITE PLAS KID SHP MOLD GRAD FOR ORAL

## (undated) DEVICE — Device

## (undated) DEVICE — HYPODERMIC SAFETY NEEDLE: Brand: MAGELLAN

## (undated) DEVICE — NEONATAL-ADULT SPO2 SENSOR: Brand: NELLCOR

## (undated) DEVICE — NON-REM POLYHESIVE PATIENT RETURN ELECTRODE: Brand: VALLEYLAB

## (undated) DEVICE — TRAP,MUCUS SPECIMEN, 80CC: Brand: MEDLINE

## (undated) DEVICE — 1200 GUARD II KIT W/5MM TUBE W/O VAC TUBE: Brand: GUARDIAN

## (undated) DEVICE — SET ADMIN 16ML TBNG L100IN 2 Y INJ SITE IV PIGGY BK DISP

## (undated) DEVICE — SYR 3ML LL TIP 1/10ML GRAD --

## (undated) DEVICE — CATH IV AUTOGRD BC PNK 20GA 25 -- INSYTE

## (undated) DEVICE — ELECTRODE,RADIOTRANSLUCENT,FOAM,5PK: Brand: MEDLINE

## (undated) DEVICE — SNARE ENDOSCP M L240CM W27MM SHTH DIA2.4MM CHN 2.8MM OVL